# Patient Record
Sex: FEMALE | Race: WHITE | NOT HISPANIC OR LATINO | Employment: FULL TIME | URBAN - METROPOLITAN AREA
[De-identification: names, ages, dates, MRNs, and addresses within clinical notes are randomized per-mention and may not be internally consistent; named-entity substitution may affect disease eponyms.]

---

## 2017-01-16 DIAGNOSIS — Z30.40 ENCOUNTER FOR SURVEILLANCE OF CONTRACEPTIVES: ICD-10-CM

## 2017-01-16 RX ORDER — NORETHINDRONE ACETATE AND ETHINYL ESTRADIOL 1MG-20(21)
1 KIT ORAL DAILY
Qty: 28 TABLET | Refills: 11 | Status: SHIPPED | OUTPATIENT
Start: 2017-01-16 | End: 2017-02-13 | Stop reason: SDUPTHER

## 2017-01-18 ENCOUNTER — OFFICE VISIT (OUTPATIENT)
Dept: INTERNAL MEDICINE | Facility: CLINIC | Age: 42
End: 2017-01-18
Payer: COMMERCIAL

## 2017-01-18 VITALS
RESPIRATION RATE: 22 BRPM | TEMPERATURE: 99 F | HEART RATE: 92 BPM | SYSTOLIC BLOOD PRESSURE: 128 MMHG | DIASTOLIC BLOOD PRESSURE: 86 MMHG | WEIGHT: 129.19 LBS | BODY MASS INDEX: 23.63 KG/M2

## 2017-01-18 DIAGNOSIS — J01.41 ACUTE RECURRENT PANSINUSITIS: Primary | ICD-10-CM

## 2017-01-18 PROCEDURE — 99213 OFFICE O/P EST LOW 20 MIN: CPT | Mod: 25,S$GLB,, | Performed by: INTERNAL MEDICINE

## 2017-01-18 PROCEDURE — 96372 THER/PROPH/DIAG INJ SC/IM: CPT | Mod: S$GLB,,, | Performed by: INTERNAL MEDICINE

## 2017-01-18 PROCEDURE — 99999 PR PBB SHADOW E&M-EST. PATIENT-LVL III: CPT | Mod: PBBFAC,,, | Performed by: INTERNAL MEDICINE

## 2017-01-18 PROCEDURE — 1159F MED LIST DOCD IN RCRD: CPT | Mod: S$GLB,,, | Performed by: INTERNAL MEDICINE

## 2017-01-18 RX ORDER — FLUTICASONE PROPIONATE 50 MCG
2 SPRAY, SUSPENSION (ML) NASAL DAILY
Qty: 16 G | Refills: 12 | Status: SHIPPED | OUTPATIENT
Start: 2017-01-18 | End: 2017-02-17

## 2017-01-18 RX ORDER — TRIAMCINOLONE ACETONIDE 40 MG/ML
40 INJECTION, SUSPENSION INTRA-ARTICULAR; INTRAMUSCULAR
Status: COMPLETED | OUTPATIENT
Start: 2017-01-18 | End: 2017-01-18

## 2017-01-18 RX ADMIN — TRIAMCINOLONE ACETONIDE 40 MG: 40 INJECTION, SUSPENSION INTRA-ARTICULAR; INTRAMUSCULAR at 09:01

## 2017-01-18 NOTE — PATIENT INSTRUCTIONS
1. Kenalog 40 mgm injection in the office.  2. Continue the Claritin-D, mucinex-DM.  3. Add flonase 2 sprays in each nostril daily for up to 10 days.  4. High oral fluid intake.  5. Continue all regular meds.  6. May need antibiotic if develops purulent discharge or phlegm.

## 2017-01-18 NOTE — MR AVS SNAPSHOT
[facility]   [facility]  [place] [address]  Phone: [phone]  Fax: [phone]                  Christelle Reyes   2017 9:20 AM   Office Visit    Description:  Female : 1975   Provider:  Sridhar Portillo MD   Department:  [facility]           Reason for Visit     Sinus Problem     Cough     Nasal Congestion           Diagnoses this Visit        Comments    Acute recurrent pansinusitis    -  Primary            To Do List           Goals (5 Years of Data)     None      Follow-Up and Disposition     Return if symptoms worsen or fail to improve.       These Medications        Disp Refills Start End    fluticasone (FLONASE) 50 mcg/actuation nasal spray 16 g 12 2017    2 sprays by Each Nare route once daily. Use for up to 10 days at a time for nasal/allergy/sinus congestion. - Each Nare    Pharmacy: [facility] [address] [address] #: [phone]         [facility] On Call     [facility] On Call Nurse Care Line -  Assistance  Registered nurses in the [facility] On Call Center provide clinical advisement, health education, appointment booking, and other advisory services.  Call for this free service at [phone].             Medications           Message regarding Medications     Verify the changes and/or additions to your medication regime listed below are the same as discussed with your clinician today.  If any of these changes or additions are incorrect, please notify your healthcare provider.        START taking these NEW medications        Refills    fluticasone (FLONASE) 50 mcg/actuation nasal spray 12    Si sprays by Each Nare route once daily. Use for up to 10 days at a time for nasal/allergy/sinus congestion.    Class: Normal    Route: Each Nare      These medications were administered today        Dose Freq    triamcinolone acetonide injection 40 mg 40 mg  Clinic/HOD 1 time    Sig: Inject 1 mL (40 mg total) into the muscle one time.    Class: Normal    Route: Intramuscular           Verify that the below list of medications is an accurate representation of the medications you are currently taking.  If none reported, the list may be blank. If incorrect, please contact your healthcare provider. Carry this list with you in case of emergency.           Current Medications     ibuprofen (ADVIL,MOTRIN) 100 MG tablet Take 100 mg by mouth every 6 (six) hours as needed.    ibuprofen (ADVIL,MOTRIN) 800 MG tablet Take 1 tablet (800 mg total) by mouth 2 (two) times daily as needed for Pain.    loratadine (CLARITIN) 10 mg tablet Take 10 mg by mouth once daily.    norethindrone-ethinyl estradiol (JUNEL FE 1/20) 1 mg-20 mcg (21)/75 mg (7) per tablet Take 1 tablet by mouth once daily.    fluticasone (FLONASE) 50 mcg/actuation nasal spray 2 sprays by Each Nare route once daily. Use for up to 10 days at a time for nasal/allergy/sinus congestion.           Clinical Reference Information           Vital Signs - Last Recorded  Most recent update: 1/18/2017  9:16 AM by Dasha Su LPN    BP Pulse Temp Resp Wt LMP    128/86 92 98.5 °F (36.9 °C) (!) 22 58.6 kg (129 lb 3 oz) 11/26/2016    BMI                23.63 kg/m2          Blood Pressure          Most Recent Value    BP  128/86      Allergies as of 1/18/2017     Codeine      Immunizations Administered on Date of Encounter - 1/18/2017     None      Instructions    1. Kenalog 40 mgm injection in the office.  2. Continue the Claritin-D, mucinex-DM.  3. Add flonase 2 sprays in each nostril daily for up to 10 days.  4. High oral fluid intake.  5. Continue all regular meds.

## 2017-01-18 NOTE — PROGRESS NOTES
Subjective:       Patient ID: Christelle Reyes is a 41 y.o. female.    Chief Complaint: Sinus Problem; Cough; and Nasal Congestion    HPI Comments: Patient presents for urgent visit complaining of flare of her recurrent sinus problems.  This episode has been going on for about 1 week.  She describes head/sinus congestion with severe post nasal drip.  Now with cough.  Has clear naso-sinus drainage and clear phelgm which she coughs. No fever/chills.    Using Claritin-D and Mucinex-DM with some relief.  She states that this usually requires a steroid injection.  No steroid usage in past 1 year.      Review of Systems   Constitutional: Negative for chills and fever.   HENT: Positive for congestion, postnasal drip, sinus pressure and sore throat. Negative for ear discharge, ear pain and facial swelling.    Respiratory: Positive for cough. Negative for shortness of breath and wheezing.        Objective:      Physical Exam   Constitutional: She appears well-developed and well-nourished. No distress.   HENT:   Head: Normocephalic.   Right Ear: External ear normal.   Left Ear: External ear normal.   Mouth/Throat: Oropharynx is clear and moist. No oropharyngeal exudate.   Canals/TMs clear bilaterally.  Inflamed, boggy turbinates.     Cardiovascular: Normal rate, regular rhythm and normal heart sounds.    Pulmonary/Chest: Effort normal and breath sounds normal. No respiratory distress. She has no wheezes.   Lymphadenopathy:     She has no cervical adenopathy.   Vitals reviewed.      Assessment:   1. Acute, recurrent pansinusitis, probably allergic in nature.     Plan:   1. Kenalog 40 mgm IM in the office.  2. Flonase, 2 sprays in each nostril, daily for 10 days.  3. High oral fluid intake.  4. Continue Claritin-D and Mucinex-DM.  5. At this time, I do not think there is a place for antibiotics.  However, if she develops yellow/green discharge or phlegm, then contact me or her regular PCP for antibiotic.

## 2017-01-19 ENCOUNTER — PATIENT MESSAGE (OUTPATIENT)
Dept: INTERNAL MEDICINE | Facility: CLINIC | Age: 42
End: 2017-01-19

## 2017-01-19 RX ORDER — AZITHROMYCIN 250 MG/1
TABLET, FILM COATED ORAL
Qty: 6 TABLET | Refills: 0 | Status: SHIPPED | OUTPATIENT
Start: 2017-01-19 | End: 2017-02-13

## 2017-01-25 ENCOUNTER — PATIENT MESSAGE (OUTPATIENT)
Dept: INTERNAL MEDICINE | Facility: CLINIC | Age: 42
End: 2017-01-25

## 2017-01-25 ENCOUNTER — TELEPHONE (OUTPATIENT)
Dept: OBSTETRICS AND GYNECOLOGY | Facility: CLINIC | Age: 42
End: 2017-01-25

## 2017-01-25 NOTE — TELEPHONE ENCOUNTER
----- Message from Isabel Frias sent at 1/25/2017  1:27 PM CST -----  Appointment Request From: Christelle Reyes         With Provider: Taniya Brooks MD [[facility]]        Would Accept With:Only the person I've selected        Preferred Date Range: From 1/25/2017 To 2/23/2017        Preferred Times: Any        Reason for visit: Request an Appt        Comments:    I would like to schedule my annual appointment with Dr. Brooks in the month of February. I have tried to find available appointments through my [facility] without success.  When I called to book an appointment, I was told her calendar for February was not yet posted and that someone would call me to schedule an appointment when Dr. Brooks's schedule became available - I have not received a call.        Christelle Reyes    [phone]

## 2017-01-25 NOTE — TELEPHONE ENCOUNTER
----- Message from Jorge Acevedo sent at 1/25/2017  1:34 PM CST -----  Contact: pt  _ 1st Request   _ 2nd Request   _ 3rd Request     Who: JOSEPH ZAMORA [[id number]]    Why: Pt missed your call is requesting a call back to schedule her annual visit    What Number to Call Back: [phone]    When to Expect a call back: (Before the end of the day)   -- if call after 3:00 call back will be tomorrow.

## 2017-01-25 NOTE — TELEPHONE ENCOUNTER
Did improve on antib but left with congestion /cough.  Fever stopped Sunday.    What would you like to recommend to patient?   mucinex and saline nasal spray regularly or more antib or other?    Please advise.  Thanks moustapha

## 2017-01-25 NOTE — TELEPHONE ENCOUNTER
Unable to reach patient on this matter. LMOVM to call the office backUnable to reach patient on this matter. LMOVM to call the office back

## 2017-02-13 ENCOUNTER — OFFICE VISIT (OUTPATIENT)
Dept: OBSTETRICS AND GYNECOLOGY | Facility: CLINIC | Age: 42
End: 2017-02-13
Payer: COMMERCIAL

## 2017-02-13 VITALS
SYSTOLIC BLOOD PRESSURE: 112 MMHG | HEIGHT: 62 IN | WEIGHT: 127.44 LBS | BODY MASS INDEX: 23.45 KG/M2 | DIASTOLIC BLOOD PRESSURE: 70 MMHG

## 2017-02-13 DIAGNOSIS — Z01.419 ENCOUNTER FOR GYNECOLOGICAL EXAMINATION WITHOUT ABNORMAL FINDING: Primary | ICD-10-CM

## 2017-02-13 DIAGNOSIS — Z30.40 ENCOUNTER FOR SURVEILLANCE OF CONTRACEPTIVES: ICD-10-CM

## 2017-02-13 PROCEDURE — 88175 CYTOPATH C/V AUTO FLUID REDO: CPT

## 2017-02-13 PROCEDURE — 99999 PR PBB SHADOW E&M-EST. PATIENT-LVL III: CPT | Mod: PBBFAC,,, | Performed by: OBSTETRICS & GYNECOLOGY

## 2017-02-13 PROCEDURE — 99395 PREV VISIT EST AGE 18-39: CPT | Mod: S$GLB,,, | Performed by: OBSTETRICS & GYNECOLOGY

## 2017-02-13 RX ORDER — NORETHINDRONE ACETATE AND ETHINYL ESTRADIOL 1MG-20(21)
1 KIT ORAL DAILY
Qty: 28 TABLET | Refills: 11 | Status: SHIPPED | OUTPATIENT
Start: 2017-02-13 | End: 2018-01-30 | Stop reason: SDUPTHER

## 2017-02-13 NOTE — MR AVS SNAPSHOT
[facility]  [address] [facility], [address]  [facility] [address]  Phone: [phone]  Fax: [phone]                  Christelle Reyes   2017 9:45 AM   Office Visit    Description:  Female : 1975   Provider:  Taniya Brooks MD   Department:  [facility]           Reason for Visit     Gynecologic Exam                To Do List           Goals (5 Years of Data)     None      [facility] On Call     [facility] On Call Nurse Care Line -  Assistance  Registered nurses in the [facility] On Call Center provide clinical advisement, health education, appointment booking, and other advisory services.  Call for this free service at [phone].             Medications           Message regarding Medications     Verify the changes and/or additions to your medication regime listed below are the same as discussed with your clinician today.  If any of these changes or additions are incorrect, please notify your healthcare provider.        STOP taking these medications     azithromycin (ZITHROMAX Z-FERNY) 250 MG tablet Take 2 tabs the first day and 1 tab qd thereafter.           Verify that the below list of medications is an accurate representation of the medications you are currently taking.  If none reported, the list may be blank. If incorrect, please contact your healthcare provider. Carry this list with you in case of emergency.           Current Medications     fluticasone (FLONASE) 50 mcg/actuation nasal spray 2 sprays by Each Nare route once daily. Use for up to 10 days at a time for nasal/allergy/sinus congestion.    ibuprofen (ADVIL,MOTRIN) 100 MG tablet Take 100 mg by mouth every 6 (six) hours as needed.    ibuprofen (ADVIL,MOTRIN) 800 MG tablet Take 1 tablet (800 mg total) by mouth 2 (two) times daily as needed for Pain.    loratadine (CLARITIN) 10 mg tablet Take 10 mg by mouth once daily.    norethindrone-ethinyl estradiol (JUNEL FE 1/20) 1 mg-20 mcg (21)/75 mg (7) per  "tablet Take 1 tablet by mouth once daily.           Clinical Reference Information           Your Vitals Were     BP Height Weight Last Period BMI    112/70 5' 2" (1.575 m) 57.8 kg (127 lb 6.8 oz) 01/23/2017 (Within Days) 23.31 kg/m2      Blood Pressure          Most Recent Value    BP  112/70      Allergies as of 2/13/2017     Codeine      Immunizations Administered on Date of Encounter - 2/13/2017     None      Language Assistance Services     ATTENTION: Language assistance services are available, free of charge. Please call [phone].      ATENCIÓN: Si habla español, tiene a العلي disposición servicios gratuitos de asistencia lingüística. Llame al [phone].     YEVGENIY Ý: N?u b?n nói Ti?ng Vi?t, có các d?ch v? h? tr? ngôn ng? mi?n phí dành cho b?n. G?i s? [phone].         [facility] complies with applicable Federal civil rights laws and does not discriminate on the basis of race, color, national origin, age, disability, or sex.        "

## 2017-02-13 NOTE — PROGRESS NOTES
"CC: Well woman exam    Christelle Reyes is a 41 y.o. female  presents for a well woman exam.  She has no issues, problems, or complaints.  Occ SYMONE--worse with wt gain or illness.  Tolerable.  Does Kegels occasionally.    Last pap 2014--normal    History reviewed. No pertinent past medical history.    Past Surgical History   Procedure Laterality Date    Mouth surgery         OB History    Para Term  AB SAB TAB Ectopic Multiple Living   3 3 3       3      # Outcome Date GA Lbr Chirag/2nd Weight Sex Delivery Anes PTL Lv   3 Term 11 40w0d   F Vag-Spont  N Y   2 Term 05 40w3d   M Vag-Spont  N Y   1 Term 01 40w0d   M Vag-Spont  N Y          Family History   Problem Relation Age of Onset    Brain cancer Paternal Grandfather     Thyroid disease Maternal Grandfather     Hypertension Father     Diabetes Father     Melanoma Maternal Aunt     Skin cancer Maternal Uncle     Glaucoma Maternal Grandmother     Cataracts Maternal Grandmother     Hyperlipidemia Mother     Hyperthyroidism Sister     Amblyopia Neg Hx     Blindness Neg Hx     Cancer Neg Hx     Macular degeneration Neg Hx     Retinal detachment Neg Hx     Strabismus Neg Hx     Stroke Neg Hx     Breast cancer Neg Hx     Colon cancer Neg Hx     Ovarian cancer Neg Hx        Social History   Substance Use Topics    Smoking status: Never Smoker    Smokeless tobacco: Never Used    Alcohol use 0.0 oz/week     0 Standard drinks or equivalent per week      Comment: rarely       Visit Vitals    /70    Ht 5' 2" (1.575 m)    Wt 57.8 kg (127 lb 6.8 oz)    LMP 2017 (Within Days)    BMI 23.31 kg/m2       ROS:  GENERAL: Denies weight gain or weight loss. Feeling well overall.   SKIN: Denies rash or lesions.   HEAD: Denies head injury or headache.   NODES: Denies enlarged lymph nodes.   CHEST: Denies chest pain or shortness of breath.   CARDIOVASCULAR: Denies palpitations or left sided chest pain. "   ABDOMEN: No abdominal pain, constipation, diarrhea, nausea, vomiting or rectal bleeding.   URINARY: No frequency, dysuria, hematuria, or burning on urination.  REPRODUCTIVE: See HPI.   BREASTS: The patient performs breast self-examination and denies pain, lumps, or nipple discharge.   HEMATOLOGIC: No easy bruisability or excessive bleeding.  MUSCULOSKELETAL: Denies joint pain or swelling.   NEUROLOGIC: Denies syncope or weakness.   PSYCHIATRIC: Denies depression, anxiety or mood swings.    Physical Exam:  APPEARANCE: Well nourished, well developed, in no acute distress.   AFFECT: WNL, alert and oriented x 3  SKIN: No acne or hirsutism  NECK: Neck symmetric without masses or thyromegaly  NODES: No inguinal, cervical, axillary, or femoral lymph node enlargement  CHEST: Good respiratory effect  ABDOMEN: Soft.  No tenderness or masses.  No hepatosplenomegaly.  No hernias.  BREASTS: Symmetrical, no skin changes or visible lesions.  No palpable masses, nipple discharge bilaterally.  Fibrocystic changes equal and bilateral.  PELVIC: Normal external genitalia without lesions.  Normal hair distribution.  Adequate perineal body, normal urethral meatus.  Vagina moist and well rugated without lesions or discharge.  Cervix pink, without lesions, discharge or tenderness.  No significant cystocele or rectocele.  Bimanual exam shows uterus to be normal size, regular, mobile and nontender.  Adnexa without masses or tenderness.    EXTREMITIES: No edema.    ASSESSMENT AND PLAN  1. Encounter for gynecological examination without abnormal finding  Liquid-based pap smear, screening   2. Encounter for surveillance of contraceptives  norethindrone-ethinyl estradiol (JUNEL FE 1/20) 1 mg-20 mcg (21)/75 mg (7) per tablet       Patient was counseled today on diet, exercise and A.C.S. Pap guidelines and recommendations for yearly pelvic exams, mammograms and monthly self breast exams; to see her PCP for other health maintenance. Next pap due  2020 if this one is normal.    Return in about 1 year (around 2/13/2018).

## 2017-06-20 ENCOUNTER — OFFICE VISIT (OUTPATIENT)
Dept: INTERNAL MEDICINE | Facility: CLINIC | Age: 42
End: 2017-06-20
Payer: COMMERCIAL

## 2017-06-20 VITALS
SYSTOLIC BLOOD PRESSURE: 123 MMHG | TEMPERATURE: 98 F | WEIGHT: 128.31 LBS | HEART RATE: 89 BPM | HEIGHT: 62 IN | BODY MASS INDEX: 23.61 KG/M2 | DIASTOLIC BLOOD PRESSURE: 84 MMHG

## 2017-06-20 DIAGNOSIS — Z00.00 ANNUAL PHYSICAL EXAM: Primary | ICD-10-CM

## 2017-06-20 DIAGNOSIS — F40.243 FLYING PHOBIA: ICD-10-CM

## 2017-06-20 PROCEDURE — 99999 PR PBB SHADOW E&M-EST. PATIENT-LVL III: CPT | Mod: PBBFAC,,, | Performed by: INTERNAL MEDICINE

## 2017-06-20 PROCEDURE — 99395 PREV VISIT EST AGE 18-39: CPT | Mod: S$GLB,,, | Performed by: INTERNAL MEDICINE

## 2017-06-20 RX ORDER — BUSPIRONE HYDROCHLORIDE 5 MG/1
5 TABLET ORAL 2 TIMES DAILY
Qty: 30 TABLET | Refills: 11 | Status: SHIPPED | OUTPATIENT
Start: 2017-06-20 | End: 2018-01-30

## 2017-06-20 NOTE — PROGRESS NOTES
Subjective:       Patient ID: Christelle Reyes is a 41 y.o. female.    Chief Complaint: Anxiety (trip in a week)    Patient is a 41 y.o.female who presents today for annual.      Cholesterol: (due now)  Gyn exam: up to date  Exercise: active  Diet: healthy  LMP: regular    Getting ready to go on a flight and has flight anxiety. Took buspar in the past which worked well.      History reviewed. No pertinent past medical history.  Past Surgical History:   Procedure Laterality Date    MOUTH SURGERY       Social History     Social History    Marital status:      Spouse name: N/A    Number of children: N/A    Years of education: N/A     Occupational History    Not on file.     Social History Main Topics    Smoking status: Never Smoker    Smokeless tobacco: Never Used    Alcohol use 0.0 oz/week      Comment: rarely    Drug use: No    Sexual activity: Yes     Partners: Male     Birth control/ protection: OCP     Other Topics Concern    Not on file     Social History Narrative    No narrative on file     Review of patient's allergies indicates:   Allergen Reactions    Codeine      Other reaction(s): Hallucinations     Ms. Reyes does not currently have medications on file.    Review of Systems   Constitutional: Negative for activity change, appetite change, chills, diaphoresis, fatigue, fever and unexpected weight change.   HENT: Negative for congestion, dental problem, ear discharge, ear pain, hearing loss, postnasal drip, rhinorrhea, sinus pressure, sore throat and trouble swallowing.    Eyes: Negative for discharge, redness, itching and visual disturbance.   Respiratory: Negative for cough, chest tightness, shortness of breath and wheezing.    Cardiovascular: Negative for chest pain, palpitations and leg swelling.   Gastrointestinal: Negative for abdominal pain, blood in stool, constipation, diarrhea, nausea and vomiting.   Endocrine: Negative for cold intolerance, heat intolerance, polydipsia  and polyuria.   Genitourinary: Negative for difficulty urinating, dysuria, frequency, hematuria, menstrual problem and urgency.   Musculoskeletal: Negative for arthralgias, back pain, gait problem, joint swelling and myalgias.   Skin: Negative for color change and rash.   Neurological: Negative for dizziness, syncope and weakness.   Hematological: Negative for adenopathy.   Psychiatric/Behavioral: Negative for behavioral problems, confusion, dysphoric mood and sleep disturbance. The patient is not nervous/anxious.        Objective:      Physical Exam   Constitutional: She is oriented to person, place, and time. She appears well-developed and well-nourished. No distress.   HENT:   Head: Normocephalic and atraumatic.   Right Ear: External ear normal.   Left Ear: External ear normal.   Nose: Nose normal.   Mouth/Throat: Oropharynx is clear and moist. No oropharyngeal exudate.   Eyes: Conjunctivae and EOM are normal. Pupils are equal, round, and reactive to light. Right eye exhibits no discharge. Left eye exhibits no discharge. No scleral icterus.   Neck: Normal range of motion. Neck supple. No JVD present. No thyromegaly present.   Cardiovascular: Normal rate, regular rhythm, normal heart sounds and intact distal pulses.  Exam reveals no gallop and no friction rub.    No murmur heard.  Pulmonary/Chest: Effort normal and breath sounds normal. No stridor. No respiratory distress. She has no wheezes. She has no rales. She exhibits no tenderness.   Abdominal: Soft. Bowel sounds are normal. She exhibits no distension. There is no tenderness. There is no rebound.   Musculoskeletal: Normal range of motion. She exhibits no edema or tenderness.   Lymphadenopathy:     She has no cervical adenopathy.   Neurological: She is alert and oriented to person, place, and time. No cranial nerve deficit.   Skin: Skin is warm. No rash noted. She is not diaphoretic. No erythema.   Psychiatric: She has a normal mood and affect. Her behavior  is normal.   Nursing note and vitals reviewed.      Assessment and Plan:       1. Annual physical exam  - CBC auto differential; Future  - Comprehensive metabolic panel; Future  - TSH; Future  - Lipid panel; Future  - Urinalysis; Future  - Vitamin D; Future    2. Flying phobia  - buspar prn          No Follow-up on file.

## 2017-07-26 ENCOUNTER — LAB VISIT (OUTPATIENT)
Dept: LAB | Facility: HOSPITAL | Age: 42
End: 2017-07-26
Attending: INTERNAL MEDICINE
Payer: COMMERCIAL

## 2017-07-26 DIAGNOSIS — Z00.00 ANNUAL PHYSICAL EXAM: ICD-10-CM

## 2017-07-26 LAB
25(OH)D3+25(OH)D2 SERPL-MCNC: 23 NG/ML
ALBUMIN SERPL BCP-MCNC: 3.6 G/DL
ALP SERPL-CCNC: 43 U/L
ALT SERPL W/O P-5'-P-CCNC: 11 U/L
ANION GAP SERPL CALC-SCNC: 9 MMOL/L
AST SERPL-CCNC: 15 U/L
BASOPHILS # BLD AUTO: 0.05 K/UL
BASOPHILS NFR BLD: 0.5 %
BILIRUB SERPL-MCNC: 0.5 MG/DL
BUN SERPL-MCNC: 10 MG/DL
CALCIUM SERPL-MCNC: 9.2 MG/DL
CHLORIDE SERPL-SCNC: 103 MMOL/L
CHOLEST/HDLC SERPL: 3 {RATIO}
CO2 SERPL-SCNC: 26 MMOL/L
CREAT SERPL-MCNC: 0.7 MG/DL
DIFFERENTIAL METHOD: NORMAL
EOSINOPHIL # BLD AUTO: 0.1 K/UL
EOSINOPHIL NFR BLD: 1.3 %
ERYTHROCYTE [DISTWIDTH] IN BLOOD BY AUTOMATED COUNT: 12.9 %
EST. GFR  (AFRICAN AMERICAN): >60 ML/MIN/1.73 M^2
EST. GFR  (NON AFRICAN AMERICAN): >60 ML/MIN/1.73 M^2
GLUCOSE SERPL-MCNC: 81 MG/DL
HCT VFR BLD AUTO: 41.6 %
HDL/CHOLESTEROL RATIO: 33.3 %
HDLC SERPL-MCNC: 174 MG/DL
HDLC SERPL-MCNC: 58 MG/DL
HGB BLD-MCNC: 14 G/DL
LDLC SERPL CALC-MCNC: 102.6 MG/DL
LYMPHOCYTES # BLD AUTO: 2.7 K/UL
LYMPHOCYTES NFR BLD: 28.4 %
MCH RBC QN AUTO: 30.2 PG
MCHC RBC AUTO-ENTMCNC: 33.7 G/DL
MCV RBC AUTO: 90 FL
MONOCYTES # BLD AUTO: 0.5 K/UL
MONOCYTES NFR BLD: 5.4 %
NEUTROPHILS # BLD AUTO: 6.1 K/UL
NEUTROPHILS NFR BLD: 64.1 %
NONHDLC SERPL-MCNC: 116 MG/DL
PLATELET # BLD AUTO: 222 K/UL
PMV BLD AUTO: 10.9 FL
POTASSIUM SERPL-SCNC: 3.7 MMOL/L
PROT SERPL-MCNC: 6.9 G/DL
RBC # BLD AUTO: 4.63 M/UL
SODIUM SERPL-SCNC: 138 MMOL/L
TRIGL SERPL-MCNC: 67 MG/DL
TSH SERPL DL<=0.005 MIU/L-ACNC: 2.07 UIU/ML
WBC # BLD AUTO: 9.51 K/UL

## 2017-07-26 PROCEDURE — 80061 LIPID PANEL: CPT

## 2017-07-26 PROCEDURE — 36415 COLL VENOUS BLD VENIPUNCTURE: CPT | Mod: PO

## 2017-07-26 PROCEDURE — 82306 VITAMIN D 25 HYDROXY: CPT

## 2017-07-26 PROCEDURE — 80053 COMPREHEN METABOLIC PANEL: CPT

## 2017-07-26 PROCEDURE — 84443 ASSAY THYROID STIM HORMONE: CPT

## 2017-07-26 PROCEDURE — 85025 COMPLETE CBC W/AUTO DIFF WBC: CPT

## 2017-12-11 ENCOUNTER — PATIENT MESSAGE (OUTPATIENT)
Dept: INTERNAL MEDICINE | Facility: CLINIC | Age: 42
End: 2017-12-11

## 2017-12-11 DIAGNOSIS — Z12.31 VISIT FOR SCREENING MAMMOGRAM: Primary | ICD-10-CM

## 2017-12-12 ENCOUNTER — HOSPITAL ENCOUNTER (OUTPATIENT)
Dept: RADIOLOGY | Facility: HOSPITAL | Age: 42
Discharge: HOME | End: 2017-12-12
Attending: INTERNAL MEDICINE
Payer: COMMERCIAL

## 2017-12-12 VITALS — WEIGHT: 128 LBS | BODY MASS INDEX: 23.55 KG/M2 | HEIGHT: 62 IN

## 2017-12-12 DIAGNOSIS — N63.0 LUMP IN FEMALE BREAST: ICD-10-CM

## 2017-12-12 DIAGNOSIS — Z12.31 VISIT FOR SCREENING MAMMOGRAM: ICD-10-CM

## 2017-12-12 PROCEDURE — 76642 ULTRASOUND BREAST LIMITED: CPT | Mod: TC,PO,RT

## 2017-12-12 PROCEDURE — 77066 DX MAMMO INCL CAD BI: CPT | Mod: 26,,, | Performed by: RADIOLOGY

## 2017-12-12 PROCEDURE — 77066 DX MAMMO INCL CAD BI: CPT | Mod: TC,PO

## 2017-12-12 PROCEDURE — 76642 ULTRASOUND BREAST LIMITED: CPT | Mod: 26,RT,, | Performed by: RADIOLOGY

## 2017-12-12 PROCEDURE — 77062 BREAST TOMOSYNTHESIS BI: CPT | Mod: 26,,, | Performed by: RADIOLOGY

## 2018-01-10 ENCOUNTER — OFFICE VISIT (OUTPATIENT)
Dept: URGENT CARE | Facility: CLINIC | Age: 43
End: 2018-01-10
Payer: COMMERCIAL

## 2018-01-10 VITALS
BODY MASS INDEX: 24.55 KG/M2 | WEIGHT: 130 LBS | SYSTOLIC BLOOD PRESSURE: 148 MMHG | DIASTOLIC BLOOD PRESSURE: 102 MMHG | OXYGEN SATURATION: 96 % | TEMPERATURE: 97 F | HEART RATE: 112 BPM | RESPIRATION RATE: 16 BRPM | HEIGHT: 61 IN

## 2018-01-10 DIAGNOSIS — J01.10 ACUTE FRONTAL SINUSITIS, RECURRENCE NOT SPECIFIED: ICD-10-CM

## 2018-01-10 DIAGNOSIS — R05.9 COUGH: Primary | ICD-10-CM

## 2018-01-10 DIAGNOSIS — J02.9 ACUTE PHARYNGITIS, UNSPECIFIED ETIOLOGY: ICD-10-CM

## 2018-01-10 PROCEDURE — 99213 OFFICE O/P EST LOW 20 MIN: CPT | Mod: S$GLB,,, | Performed by: INTERNAL MEDICINE

## 2018-01-10 RX ORDER — AZITHROMYCIN 250 MG/1
250 TABLET, FILM COATED ORAL DAILY
Qty: 6 TABLET | Refills: 0 | Status: SHIPPED | OUTPATIENT
Start: 2018-01-10 | End: 2018-01-15

## 2018-01-10 RX ORDER — PREDNISONE 20 MG/1
20 TABLET ORAL DAILY
Qty: 5 TABLET | Refills: 0 | Status: SHIPPED | OUTPATIENT
Start: 2018-01-10 | End: 2018-01-15

## 2018-01-10 NOTE — PATIENT INSTRUCTIONS
Acute Bacterial Rhinosinusitis (ABRS)    Acute bacterial rhinosinusitis (ABRS) is an infection of your nasal cavity and sinuses. Its caused by bacteria. Acute means that youve had symptoms for less than 12 weeks.  Understanding your sinuses  The nasal cavity is the large air-filled space behind your nose. The sinuses are a group of spaces formed by the bones of your face. They connect with your nasal cavity. ABRS causes the tissue lining these spaces to become inflamed. Mucus may not drain normally. This leads to facial pain and other symptoms.  What causes ABRS?  ABRS most often follows an upper respiratory infection caused by a virus. Bacteria then infect the lining of your nasal cavity and sinuses. But you can also get ABRS if you have:  · Nasal allergies  · Long-term nasal swelling and congestion not caused by allergies  · Blockage in the nose  Symptoms of ABRS  The symptoms of ABRS may be different for each person, and can include:  · Nasal congestion  · Runny nose  · Fluid draining from the nose down the throat (postnasal drip)  · Headache  · Cough  · Pain in the sinuses  · Thick, colored fluid from the nose (mucus)  · Fever  Diagnosing ABRS  ABRS may be diagnosed if youve had an upper respiratory infection like a cold and cough for longer than 10 to 14 days. Your health care provider will ask about your symptoms and your medical history. The provider will check your vital signs, including your temperature. Youll have a physical exam. The health care provider will check your ears, nose, and throat. You likely wont need any tests. If ABRS comes back, you may have a culture or other tests.  Treatment for ABRS  Treatment may include:  · Antibiotic medicine. This is for symptoms that last for at least 10 to 14 days.  · Nasal corticosteroid medicine. Drops or spray used in the nose can lessen swelling and congestion.  · Over-the-counter pain medicine. This is to lessen sinus pain and pressure.  · Nasal  decongestant medicine. Spray or drops may help to lessen congestion. Do not use them for more than a few days.  · Salt wash (saline irrigation). This can help to loosen mucus.  Possible complications of ABRS  ABRS may come back or become long-term (chronic).  In rare cases, ABRS may cause complications such as:   · Inflamed tissue around the brain and spinal cord (meningitis)  · Inflamed tissue around the eyes (orbital cellulitis)  · Inflamed bones around the sinuses (osteitis)  These problems may need to be treated in a hospital with intravenous (IV) antibiotic medicine or surgery.  When to call the health care provider  Call your health care provider if you have any of the following:  · Symptoms that dont get better, or get worse  · Symptoms that dont get better after 3 to 5 days on antibiotics  · Trouble seeing  · Swelling around your eyes  · Confusion or trouble staying awake   Date Last Reviewed: 3/3/2015  © 6399-8153 [employer]. [address] [place] [address]. All rights reserved. This information is not intended as a substitute for professional medical care. Always follow your healthcare professional's instructions.  Please return here or go to the Emergency Department for any concerns or worsening of condition.  If you were prescribed antibiotics, please take them to completion.  If you were prescribed a narcotic medication, do not drive or operate heavy equipment or machinery while taking these medications.  Please follow up with your primary care doctor or specialist as needed.    If you  smoke, please stop smoking.  1) Motrin/advil/ibuprofen- Take Two to Three Tablets(200 mg) three Times a Day for 5 to 7 Days.  2) Mucinex D 1/2 to 1 Tablet twice a day for 5 to 7 Days.  3) Drink Hot Liquids(coffee,WATER,Tea,Hot Chocolate,or Soup) that you put in a Mug place in Microwave for 2.5 to 3 minutes CHANGE THE CUP THAT WAS USED IN THE MICROWAVE SO AS NOT TO BURN YOUR MOUTH,then sniff the  steam from the cup and sip the heated liquid TEN TO TWELVE TIMES A DAY for 5 to 7 Days.  4) These 3 things will help the antibiotics and other medications work faster and will speed your recovery!

## 2018-01-10 NOTE — PROGRESS NOTES
"Subjective:       Patient ID: Christelle Reyes is a 42 y.o. female.    Vitals:  height is 5' 1" (1.549 m) and weight is 59 kg (130 lb). Her oral temperature is 97.3 °F (36.3 °C). Her blood pressure is 148/102 (abnormal) and her pulse is 112 (abnormal). Her respiration is 16 and oxygen saturation is 96%.     Chief Complaint: Cough and Sore Throat    Cough   This is a new problem. The current episode started in the past 7 days. The problem has been gradually worsening. The cough is productive of purulent sputum. Associated symptoms include a sore throat. Pertinent negatives include no chest pain, chills, ear pain, eye redness, fever, headaches, myalgias, shortness of breath or wheezing. Nothing aggravates the symptoms. Treatments tried: mucinex. The treatment provided no relief.   Sore Throat    This is a new problem. The current episode started in the past 7 days. There has been no fever. Associated symptoms include coughing. Pertinent negatives include no abdominal pain, congestion, ear pain, headaches, hoarse voice or shortness of breath. She has tried nothing for the symptoms. The treatment provided no relief.     Review of Systems   Constitution: Negative for chills, fever and malaise/fatigue.   HENT: Positive for sore throat. Negative for congestion, ear pain and hoarse voice.    Eyes: Negative for discharge and redness.   Cardiovascular: Negative for chest pain, dyspnea on exertion and leg swelling.   Respiratory: Positive for cough. Negative for shortness of breath, sputum production and wheezing.    Musculoskeletal: Negative for myalgias.   Gastrointestinal: Negative for abdominal pain and nausea.   Neurological: Negative for headaches.       Objective:      Physical Exam   Constitutional: She is oriented to person, place, and time. She appears well-developed and well-nourished. She is cooperative.  Non-toxic appearance. She does not appear ill. No distress.   HENT:   Head: Normocephalic and atraumatic. "   Right Ear: Hearing, external ear and ear canal normal. Tympanic membrane is injected and erythematous.   Left Ear: Hearing, external ear and ear canal normal. Tympanic membrane is injected and erythematous.   Nose: Mucosal edema and rhinorrhea present. No nasal deformity. No epistaxis. Right sinus exhibits no maxillary sinus tenderness and no frontal sinus tenderness. Left sinus exhibits frontal sinus tenderness. Left sinus exhibits no maxillary sinus tenderness.   Mouth/Throat: Uvula is midline, oropharynx is clear and moist and mucous membranes are normal. No trismus in the jaw. Normal dentition. No uvula swelling. No posterior oropharyngeal erythema.       Eyes: Conjunctivae and lids are normal. No scleral icterus.   Sclera clear bilat   Neck: Trachea normal, full passive range of motion without pain and phonation normal. Neck supple.   Cardiovascular: Normal rate, regular rhythm, normal heart sounds, intact distal pulses and normal pulses.    Pulmonary/Chest: Effort normal and breath sounds normal. No respiratory distress.   Abdominal: Soft. Normal appearance and bowel sounds are normal. She exhibits no distension. There is no tenderness.   Musculoskeletal: Normal range of motion. She exhibits no edema or deformity.   Neurological: She is alert and oriented to person, place, and time. She exhibits normal muscle tone. Coordination normal.   Skin: Skin is warm, dry and intact. She is not diaphoretic. No pallor.   Psychiatric: She has a normal mood and affect. Her speech is normal and behavior is normal. Judgment and thought content normal. Cognition and memory are normal.   Nursing note and vitals reviewed.      Assessment:       1. Cough    2. Acute frontal sinusitis, recurrence not specified    3. Acute pharyngitis, unspecified etiology        Plan:         Cough    Acute frontal sinusitis, recurrence not specified  -     predniSONE (DELTASONE) 20 MG tablet; Take 1 tablet (20 mg total) by mouth once daily.   Dispense: 5 tablet; Refill: 0  -     azithromycin (ZITHROMAX) 250 MG tablet; Take 1 tablet (250 mg total) by mouth once daily. Double first dose  Dispense: 6 tablet; Refill: 0    Acute pharyngitis, unspecified etiology  -     predniSONE (DELTASONE) 20 MG tablet; Take 1 tablet (20 mg total) by mouth once daily.  Dispense: 5 tablet; Refill: 0  -     azithromycin (ZITHROMAX) 250 MG tablet; Take 1 tablet (250 mg total) by mouth once daily. Double first dose  Dispense: 6 tablet; Refill: 0      Take meds

## 2018-01-30 ENCOUNTER — OFFICE VISIT (OUTPATIENT)
Dept: OBSTETRICS AND GYNECOLOGY | Facility: CLINIC | Age: 43
End: 2018-01-30
Payer: COMMERCIAL

## 2018-01-30 VITALS
BODY MASS INDEX: 24.77 KG/M2 | WEIGHT: 131.19 LBS | HEIGHT: 61 IN | DIASTOLIC BLOOD PRESSURE: 88 MMHG | SYSTOLIC BLOOD PRESSURE: 124 MMHG

## 2018-01-30 DIAGNOSIS — Z01.419 WOMEN'S ANNUAL ROUTINE GYNECOLOGICAL EXAMINATION: Primary | ICD-10-CM

## 2018-01-30 DIAGNOSIS — Z30.49 ENCOUNTER FOR SURVEILLANCE OF OTHER CONTRACEPTIVE: ICD-10-CM

## 2018-01-30 DIAGNOSIS — Z12.31 ENCOUNTER FOR SCREENING MAMMOGRAM FOR BREAST CANCER: ICD-10-CM

## 2018-01-30 PROCEDURE — 99396 PREV VISIT EST AGE 40-64: CPT | Mod: S$GLB,,, | Performed by: NURSE PRACTITIONER

## 2018-01-30 PROCEDURE — 99999 PR PBB SHADOW E&M-EST. PATIENT-LVL III: CPT | Mod: PBBFAC,,, | Performed by: NURSE PRACTITIONER

## 2018-01-30 RX ORDER — NORETHINDRONE ACETATE AND ETHINYL ESTRADIOL 1MG-20(21)
1 KIT ORAL DAILY
Qty: 28 TABLET | Refills: 11 | Status: SHIPPED | OUTPATIENT
Start: 2018-01-30 | End: 2018-12-04 | Stop reason: SDUPTHER

## 2018-01-30 NOTE — PROGRESS NOTES
CC: Annual  HPI: Pt is a 42 y.o.  female who presents for routine annual exam.  Works for [employer].  She uses OCPs for contraception. She does not want STD screening.  Denies any GYN complaints. Pt has normal mammogram and breast US in 12/2017.        ROS:  GENERAL: Feeling well overall. Denies fever or chills.   SKIN: Denies rash or lesions.   HEAD: Denies head injury or headache.   NODES: Denies enlarged lymph nodes.   CHEST: Denies chest pain or shortness of breath.   CARDIOVASCULAR: Denies palpitations or left sided chest pain.   ABDOMEN: No abdominal pain, constipation, diarrhea, nausea, vomiting or rectal bleeding.   URINARY: No dysuria, hematuria, or burning on urination.  REPRODUCTIVE: See HPI.   BREASTS: Denies pain, lumps, or nipple discharge.   HEMATOLOGIC: No easy bruisability or excessive bleeding.   MUSCULOSKELETAL: Denies joint pain or swelling.   NEUROLOGIC: Denies syncope or weakness.   PSYCHIATRIC: Denies depression, anxiety or mood swings.    PE:   APPEARANCE: Well nourished, well developed, White female in no acute distress.  NODES: no cervical, supraclavicular, or inguinal lymphadenopathy  BREASTS: Symmetrical, no skin changes or visible lesions. No palpable masses, nipple discharge or adenopathy bilaterally.  ABDOMEN: Soft. No tenderness or masses. No distention. No hernias palpated. No CVA tenderness.  VULVA: No lesions. Normal external female genitalia.  URETHRAL MEATUS: Normal size and location, no lesions, no prolapse.  URETHRA: No masses, tenderness, or prolapse.  VAGINA: Moist. No lesions or lacerations noted. No abnormal discharge present. No odor present.   CERVIX: No lesions or discharge. No cervical motion tenderness.   UTERUS: Normal size, regular shape, mobile, non-tender.  ADNEXA: No tenderness. No fullness or masses palpated in the adnexal regions.   ANUS PERINEUM: Normal.      Diagnosis:  1. Women's annual routine gynecological examination    2. Encounter for  surveillance of other contraceptive    3. Encounter for screening mammogram for breast cancer        Plan:   Pap not indicated- last pap 2/2017 WNL  Mammo in 12/2018  OCPs refilled    Orders Placed This Encounter    Mammo Digital Screening Bilat with Tomos[employer] CAD    norethindrone-ethinyl estradiol (JUNEL FE 1/20) 1 mg-20 mcg (21)/75 mg (7) per tablet       Patient was counseled today on the new ACS guidelines for cervical cytology screening as well as the current recommendations for breast cancer screening. She was counseled to follow up with her PCP for other routine health maintenance. Counseling session lasted approximately 10 minutes, and all her questions were answered.    Follow-up with me in 1 year for routine exam    CHEYENNE SrC

## 2018-03-23 ENCOUNTER — PATIENT MESSAGE (OUTPATIENT)
Dept: INTERNAL MEDICINE | Facility: CLINIC | Age: 43
End: 2018-03-23

## 2018-03-27 ENCOUNTER — PATIENT MESSAGE (OUTPATIENT)
Dept: INTERNAL MEDICINE | Facility: CLINIC | Age: 43
End: 2018-03-27

## 2018-11-08 ENCOUNTER — PATIENT MESSAGE (OUTPATIENT)
Dept: INTERNAL MEDICINE | Facility: CLINIC | Age: 43
End: 2018-11-08

## 2018-11-08 DIAGNOSIS — Z00.00 ANNUAL PHYSICAL EXAM: Primary | ICD-10-CM

## 2018-11-08 DIAGNOSIS — M25.50 ARTHRALGIA, UNSPECIFIED JOINT: ICD-10-CM

## 2018-12-04 ENCOUNTER — PATIENT MESSAGE (OUTPATIENT)
Dept: OBSTETRICS AND GYNECOLOGY | Facility: CLINIC | Age: 43
End: 2018-12-04

## 2018-12-04 DIAGNOSIS — Z30.49 ENCOUNTER FOR SURVEILLANCE OF OTHER CONTRACEPTIVE: ICD-10-CM

## 2018-12-04 RX ORDER — NORETHINDRONE ACETATE AND ETHINYL ESTRADIOL AND FERROUS FUMARATE 1MG-20(21)
KIT ORAL
Qty: 28 TABLET | Refills: 11 | Status: SHIPPED | OUTPATIENT
Start: 2018-12-04 | End: 2018-12-04 | Stop reason: SDUPTHER

## 2018-12-04 RX ORDER — NORETHINDRONE ACETATE AND ETHINYL ESTRADIOL 1MG-20(21)
1 KIT ORAL DAILY
Qty: 28 TABLET | Refills: 1 | Status: SHIPPED | OUTPATIENT
Start: 2018-12-04 | End: 2019-01-18 | Stop reason: SDUPTHER

## 2019-01-15 ENCOUNTER — HOSPITAL ENCOUNTER (OUTPATIENT)
Dept: RADIOLOGY | Facility: HOSPITAL | Age: 44
Discharge: HOME | End: 2019-01-15
Attending: NURSE PRACTITIONER
Payer: COMMERCIAL

## 2019-01-15 VITALS — HEIGHT: 61 IN | BODY MASS INDEX: 24.73 KG/M2 | WEIGHT: 131 LBS

## 2019-01-15 DIAGNOSIS — Z12.31 ENCOUNTER FOR SCREENING MAMMOGRAM FOR BREAST CANCER: ICD-10-CM

## 2019-01-15 PROCEDURE — 77067 SCR MAMMO BI INCL CAD: CPT | Mod: TC

## 2019-01-15 PROCEDURE — 77063 MAMMO DIGITAL SCREENING BILAT WITH TOMOSYNTHESIS_CAD: ICD-10-PCS | Mod: 26,,, | Performed by: RADIOLOGY

## 2019-01-15 PROCEDURE — 77063 BREAST TOMOSYNTHESIS BI: CPT | Mod: 26,,, | Performed by: RADIOLOGY

## 2019-01-15 PROCEDURE — 77067 MAMMO DIGITAL SCREENING BILAT WITH TOMOSYNTHESIS_CAD: ICD-10-PCS | Mod: 26,,, | Performed by: RADIOLOGY

## 2019-01-15 PROCEDURE — 77067 SCR MAMMO BI INCL CAD: CPT | Mod: 26,,, | Performed by: RADIOLOGY

## 2019-01-18 ENCOUNTER — OFFICE VISIT (OUTPATIENT)
Dept: OBSTETRICS AND GYNECOLOGY | Facility: CLINIC | Age: 44
End: 2019-01-18
Payer: COMMERCIAL

## 2019-01-18 VITALS
DIASTOLIC BLOOD PRESSURE: 60 MMHG | SYSTOLIC BLOOD PRESSURE: 120 MMHG | WEIGHT: 134.25 LBS | HEIGHT: 62 IN | BODY MASS INDEX: 24.7 KG/M2

## 2019-01-18 DIAGNOSIS — N39.3 SUI (STRESS URINARY INCONTINENCE, FEMALE): ICD-10-CM

## 2019-01-18 DIAGNOSIS — Z30.49 ENCOUNTER FOR SURVEILLANCE OF OTHER CONTRACEPTIVE: ICD-10-CM

## 2019-01-18 DIAGNOSIS — Z01.419 WOMEN'S ANNUAL ROUTINE GYNECOLOGICAL EXAMINATION: Primary | ICD-10-CM

## 2019-01-18 DIAGNOSIS — N81.6 RECTOCELE: ICD-10-CM

## 2019-01-18 PROCEDURE — 99999 PR PBB SHADOW E&M-EST. PATIENT-LVL III: ICD-10-PCS | Mod: PBBFAC,,, | Performed by: NURSE PRACTITIONER

## 2019-01-18 PROCEDURE — 99999 PR PBB SHADOW E&M-EST. PATIENT-LVL III: CPT | Mod: PBBFAC,,, | Performed by: NURSE PRACTITIONER

## 2019-01-18 PROCEDURE — 99396 PREV VISIT EST AGE 40-64: CPT | Mod: S$GLB,,, | Performed by: NURSE PRACTITIONER

## 2019-01-18 PROCEDURE — 99396 PR PREVENTIVE VISIT,EST,40-64: ICD-10-PCS | Mod: S$GLB,,, | Performed by: NURSE PRACTITIONER

## 2019-01-18 RX ORDER — NORETHINDRONE ACETATE AND ETHINYL ESTRADIOL 1MG-20(21)
1 KIT ORAL DAILY
Qty: 84 TABLET | Refills: 3 | Status: SHIPPED | OUTPATIENT
Start: 2019-01-18 | End: 2020-01-22 | Stop reason: SDUPTHER

## 2019-01-18 NOTE — PROGRESS NOTES
CC: Annual  HPI: Pt is a 43 y.o.  female who presents for routine annual exam. She has 3 children - oldest will be starting technical college in the fall. She works for [employer]. She uses OCPs for contraception. She does not want STD screening.  Reports SYMONE with sneezing, coughing, and laughing.  Reports bladder feels heavier when she puts on weight.  The patient participates in regular exercise: no.  The patient does not smoke.  The patient wears seatbelts.   Pt denies any domestic violence.  Mammo is UTD 1/2019 WNL.      FH:  Breast cancer: none  Colon cancer: none  Ovarian cancer: none  Endometrial cancer: none    ROS:  GENERAL: Feeling well overall. Denies fever or chills.   SKIN: Denies rash or lesions.   HEAD: Denies head injury or headache.   NODES: Denies enlarged lymph nodes.   CHEST: Denies chest pain or shortness of breath.   CARDIOVASCULAR: Denies palpitations or left sided chest pain.   ABDOMEN: No abdominal pain, constipation, diarrhea, nausea, vomiting or rectal bleeding.   URINARY: No dysuria, hematuria, or burning on urination.  REPRODUCTIVE: See HPI.   BREASTS: Denies pain, lumps, or nipple discharge.   HEMATOLOGIC: No easy bruisability or excessive bleeding.   MUSCULOSKELETAL: Denies joint pain or swelling.   NEUROLOGIC: Denies syncope or weakness.   PSYCHIATRIC: Denies depression, anxiety or mood swings.    PE:   APPEARANCE: Well nourished, well developed, White female in no acute distress.  NODES: no cervical, supraclavicular, or inguinal lymphadenopathy  BREASTS: Symmetrical, no skin changes or visible lesions. No palpable masses, nipple discharge or adenopathy bilaterally.  ABDOMEN: Soft. No tenderness or masses. No distention. No hernias palpated. No CVA tenderness.  VULVA: No lesions. Normal external female genitalia.  URETHRAL MEATUS: Normal size and location, no lesions, no prolapse.  URETHRA: No masses, tenderness, or prolapse.  VAGINA: Moist. No lesions or lacerations noted. No  abnormal discharge present. No odor present.   CERVIX: No lesions or discharge. No cervical motion tenderness.   UTERUS: Normal size, regular shape, mobile, non-tender. + mild rectocele  ADNEXA: No tenderness. No fullness or masses palpated in the adnexal regions.   ANUS PERINEUM: Normal.      Diagnosis:  1. Women's annual routine gynecological examination    2. SYMONE (stress urinary incontinence, female)    3. Rectocele    4. Encounter for surveillance of other contraceptive        Plan:   Pap not indicated - last pap 2/2017 WNL  OCPs refilled  Mammo in 1/2020  Discussed interventions for SYMONE- timed voiding, decreasing bladder irritants, and Kegel exercise.   Discussed referral to uro gyn for evaluation of SYMONE- declines at present will contact clinic if worsens     Orders Placed This Encounter    norethindrone-ethinyl estradiol (MICROGESTIN FE 1/20, 28,) 1 mg-20 mcg (21)/75 mg (7) per tablet       Patient was counseled today on the new ACS guidelines for cervical cytology screening as well as the current recommendations for breast cancer screening. She was counseled to follow up with her PCP for other routine health maintenance. Counseling session lasted approximately 10 minutes, and all her questions were answered.    Follow-up with me in 1 year for routine exam    DOC Sr

## 2019-01-29 DIAGNOSIS — Z30.49 ENCOUNTER FOR SURVEILLANCE OF OTHER CONTRACEPTIVE: ICD-10-CM

## 2019-01-29 RX ORDER — NORETHINDRONE ACETATE AND ETHINYL ESTRADIOL AND FERROUS FUMARATE 1MG-20(21)
KIT ORAL
Qty: 28 TABLET | Refills: 1 | Status: SHIPPED | OUTPATIENT
Start: 2019-01-29 | End: 2019-03-26 | Stop reason: SDUPTHER

## 2019-03-26 DIAGNOSIS — Z30.49 ENCOUNTER FOR SURVEILLANCE OF OTHER CONTRACEPTIVE: ICD-10-CM

## 2019-03-27 RX ORDER — NORETHINDRONE ACETATE AND ETHINYL ESTRADIOL AND FERROUS FUMARATE 1MG-20(21)
KIT ORAL
Qty: 28 TABLET | Refills: 1 | Status: SHIPPED | OUTPATIENT
Start: 2019-03-27 | End: 2019-05-21 | Stop reason: SDUPTHER

## 2019-05-21 DIAGNOSIS — Z30.49 ENCOUNTER FOR SURVEILLANCE OF OTHER CONTRACEPTIVE: ICD-10-CM

## 2019-05-21 RX ORDER — NORETHINDRONE ACETATE AND ETHINYL ESTRADIOL AND FERROUS FUMARATE 1MG-20(21)
KIT ORAL
Qty: 28 TABLET | Refills: 1 | Status: SHIPPED | OUTPATIENT
Start: 2019-05-21 | End: 2020-01-22

## 2019-06-07 ENCOUNTER — PATIENT MESSAGE (OUTPATIENT)
Dept: OBSTETRICS AND GYNECOLOGY | Facility: CLINIC | Age: 44
End: 2019-06-07

## 2020-01-22 ENCOUNTER — OFFICE VISIT (OUTPATIENT)
Dept: OBSTETRICS AND GYNECOLOGY | Facility: CLINIC | Age: 45
End: 2020-01-22
Payer: COMMERCIAL

## 2020-01-22 VITALS
SYSTOLIC BLOOD PRESSURE: 122 MMHG | WEIGHT: 135.38 LBS | DIASTOLIC BLOOD PRESSURE: 82 MMHG | BODY MASS INDEX: 24.76 KG/M2

## 2020-01-22 DIAGNOSIS — N92.1 BREAKTHROUGH BLEEDING ON BIRTH CONTROL PILLS: ICD-10-CM

## 2020-01-22 DIAGNOSIS — Z11.51 SCREENING FOR HPV (HUMAN PAPILLOMAVIRUS): ICD-10-CM

## 2020-01-22 DIAGNOSIS — Z12.4 ENCOUNTER FOR PAPANICOLAOU SMEAR FOR CERVICAL CANCER SCREENING: ICD-10-CM

## 2020-01-22 DIAGNOSIS — Z01.419 WOMEN'S ANNUAL ROUTINE GYNECOLOGICAL EXAMINATION: Primary | ICD-10-CM

## 2020-01-22 DIAGNOSIS — Z30.49 ENCOUNTER FOR SURVEILLANCE OF OTHER CONTRACEPTIVE: ICD-10-CM

## 2020-01-22 DIAGNOSIS — Z12.31 ENCOUNTER FOR SCREENING MAMMOGRAM FOR BREAST CANCER: ICD-10-CM

## 2020-01-22 LAB
B-HCG UR QL: NEGATIVE
CTP QC/QA: YES

## 2020-01-22 PROCEDURE — 87624 HPV HI-RISK TYP POOLED RSLT: CPT

## 2020-01-22 PROCEDURE — 81025 POCT URINE PREGNANCY: ICD-10-PCS | Mod: S$GLB,,, | Performed by: NURSE PRACTITIONER

## 2020-01-22 PROCEDURE — 99999 PR PBB SHADOW E&M-EST. PATIENT-LVL III: CPT | Mod: PBBFAC,,, | Performed by: NURSE PRACTITIONER

## 2020-01-22 PROCEDURE — 88175 CYTOPATH C/V AUTO FLUID REDO: CPT

## 2020-01-22 PROCEDURE — 99396 PR PREVENTIVE VISIT,EST,40-64: ICD-10-PCS | Mod: S$GLB,,, | Performed by: NURSE PRACTITIONER

## 2020-01-22 PROCEDURE — 99396 PREV VISIT EST AGE 40-64: CPT | Mod: S$GLB,,, | Performed by: NURSE PRACTITIONER

## 2020-01-22 PROCEDURE — 99999 PR PBB SHADOW E&M-EST. PATIENT-LVL III: ICD-10-PCS | Mod: PBBFAC,,, | Performed by: NURSE PRACTITIONER

## 2020-01-22 PROCEDURE — 81025 URINE PREGNANCY TEST: CPT | Mod: S$GLB,,, | Performed by: NURSE PRACTITIONER

## 2020-01-22 RX ORDER — NORETHINDRONE ACETATE AND ETHINYL ESTRADIOL 1MG-20(21)
1 KIT ORAL DAILY
Qty: 84 TABLET | Refills: 3 | Status: SHIPPED | OUTPATIENT
Start: 2020-01-22 | End: 2020-03-02

## 2020-01-22 NOTE — PROGRESS NOTES
CC: Annual  HPI: Pt is a 44 y.o.  female who presents for routine annual exam. She uses OCPs for contraception. She does not want STD screening.  Reports she had some BTGB on her last 2 menstral cycles.  Denies any missed or late pills. No new sexual partners.  Reports had some RLQ pain with the BTB- this has now resolved.  UPT is negative.  She is in need of a screening mammo- last 1/19 WNL.    FH:  Breast cancer: none  Colon cancer: none  Ovarian cancer: none  Endometrial cancer: none    ROS:  GENERAL: Feeling well overall. Denies fever or chills.   SKIN: Denies rash or lesions.   HEAD: Denies head injury or headache.   NODES: Denies enlarged lymph nodes.   CHEST: Denies chest pain or shortness of breath.   CARDIOVASCULAR: Denies palpitations or left sided chest pain.   ABDOMEN: No abdominal pain, constipation, diarrhea, nausea, vomiting or rectal bleeding.   URINARY: No dysuria, hematuria, or burning on urination.  REPRODUCTIVE: See HPI.   BREASTS: Denies pain, lumps, or nipple discharge.   HEMATOLOGIC: No easy bruisability or excessive bleeding.   MUSCULOSKELETAL: Denies joint pain or swelling.   NEUROLOGIC: Denies syncope or weakness.   PSYCHIATRIC: Denies depression, anxiety or mood swings.    PE:   APPEARANCE: Well nourished, well developed, White female in no acute distress.  NODES: no cervical, supraclavicular, or inguinal lymphadenopathy  BREASTS: Symmetrical, no skin changes or visible lesions. No palpable masses, nipple discharge or adenopathy bilaterally.  ABDOMEN: Soft. No tenderness or masses. No distention. No hernias palpated. No CVA tenderness.  VULVA: No lesions. Normal external female genitalia.  URETHRAL MEATUS: Normal size and location, no lesions, no prolapse.  URETHRA: No masses, tenderness, or prolapse.  VAGINA: Moist. No lesions or lacerations noted. No abnormal discharge present. No odor present.   CERVIX: No lesions or discharge. No cervical motion tenderness.   UTERUS: Normal size,  regular shape, mobile, non-tender.  ADNEXA: No tenderness. No fullness or masses palpated in the adnexal regions.   ANUS PERINEUM: Normal.      Diagnosis:  1. Women's annual routine gynecological examination    2. Encounter for Papanicolaou smear for cervical cancer screening    3. Screening for HPV (human papillomavirus)    4. Encounter for screening mammogram for breast cancer    5. Breakthrough bleeding on birth control pills    6. Encounter for surveillance of other contraceptive        Plan:   Pap/ HPV  Mammo   UPT is negative  Discussed to notify clinic if BTB returns or if pelvic pain returns and will obtain pelvic US for eval  Discussed can increase to a higher mcg dosage if needed to help control BTB     Orders Placed This Encounter    HPV High Risk Genotypes, PCR    Mammo Digital Screening Bilat w/ Mateo    POCT Urine Pregnancy    Liquid-Based Pap Smear, Screening    norethindrone-ethinyl estradiol (MICROGESTIN FE 1/20, 28,) 1 mg-20 mcg (21)/75 mg (7) per tablet       Patient was counseled today on the new ACS guidelines for cervical cytology screening as well as the current recommendations for breast cancer screening. She was counseled to follow up with her PCP for other routine health maintenance. Counseling session lasted approximately 10 minutes, and all her questions were answered.    Follow-up with me in 1 year for routine exam    DOC Sr

## 2020-01-28 LAB
HPV HR 12 DNA SPEC QL NAA+PROBE: NEGATIVE
HPV16 AG SPEC QL: NEGATIVE
HPV18 DNA SPEC QL NAA+PROBE: NEGATIVE

## 2020-01-31 ENCOUNTER — HOSPITAL ENCOUNTER (OUTPATIENT)
Dept: RADIOLOGY | Facility: HOSPITAL | Age: 45
Discharge: HOME | End: 2020-01-31
Attending: NURSE PRACTITIONER
Payer: COMMERCIAL

## 2020-01-31 DIAGNOSIS — Z12.31 ENCOUNTER FOR SCREENING MAMMOGRAM FOR BREAST CANCER: ICD-10-CM

## 2020-01-31 PROCEDURE — 77063 MAMMO DIGITAL SCREENING BILAT WITH TOMOSYNTHESIS_CAD: ICD-10-PCS | Mod: 26,,, | Performed by: RADIOLOGY

## 2020-01-31 PROCEDURE — 77067 SCR MAMMO BI INCL CAD: CPT | Mod: 26,,, | Performed by: RADIOLOGY

## 2020-01-31 PROCEDURE — 77063 BREAST TOMOSYNTHESIS BI: CPT | Mod: 26,,, | Performed by: RADIOLOGY

## 2020-01-31 PROCEDURE — 77067 SCR MAMMO BI INCL CAD: CPT | Mod: TC

## 2020-01-31 PROCEDURE — 77067 MAMMO DIGITAL SCREENING BILAT WITH TOMOSYNTHESIS_CAD: ICD-10-PCS | Mod: 26,,, | Performed by: RADIOLOGY

## 2020-02-12 ENCOUNTER — PATIENT MESSAGE (OUTPATIENT)
Dept: OBSTETRICS AND GYNECOLOGY | Facility: CLINIC | Age: 45
End: 2020-02-12

## 2020-02-12 DIAGNOSIS — R10.2 PELVIC PAIN: Primary | ICD-10-CM

## 2020-02-12 LAB
FINAL PATHOLOGIC DIAGNOSIS: NORMAL
Lab: NORMAL

## 2020-02-13 ENCOUNTER — PATIENT MESSAGE (OUTPATIENT)
Dept: OBSTETRICS AND GYNECOLOGY | Facility: CLINIC | Age: 45
End: 2020-02-13

## 2020-02-14 ENCOUNTER — HOSPITAL ENCOUNTER (OUTPATIENT)
Dept: RADIOLOGY | Facility: OTHER | Age: 45
Discharge: HOME | End: 2020-02-14
Attending: NURSE PRACTITIONER
Payer: COMMERCIAL

## 2020-02-14 ENCOUNTER — TELEPHONE (OUTPATIENT)
Dept: OBSTETRICS AND GYNECOLOGY | Facility: CLINIC | Age: 45
End: 2020-02-14

## 2020-02-14 DIAGNOSIS — R10.2 PELVIC PAIN: ICD-10-CM

## 2020-02-14 PROCEDURE — 76856 US EXAM PELVIC COMPLETE: CPT | Mod: 26,,, | Performed by: INTERNAL MEDICINE

## 2020-02-14 PROCEDURE — 76830 US PELVIS COMP WITH TRANSVAG NON-OB (XPD): ICD-10-PCS | Mod: 26,,, | Performed by: INTERNAL MEDICINE

## 2020-02-14 PROCEDURE — 76856 US PELVIS COMP WITH TRANSVAG NON-OB (XPD): ICD-10-PCS | Mod: 26,,, | Performed by: INTERNAL MEDICINE

## 2020-02-14 PROCEDURE — 76830 TRANSVAGINAL US NON-OB: CPT | Mod: 26,,, | Performed by: INTERNAL MEDICINE

## 2020-02-14 PROCEDURE — 76830 TRANSVAGINAL US NON-OB: CPT | Mod: TC

## 2020-02-17 ENCOUNTER — PATIENT MESSAGE (OUTPATIENT)
Dept: OBSTETRICS AND GYNECOLOGY | Facility: CLINIC | Age: 45
End: 2020-02-17

## 2020-03-02 ENCOUNTER — OFFICE VISIT (OUTPATIENT)
Dept: OBSTETRICS AND GYNECOLOGY | Facility: CLINIC | Age: 45
End: 2020-03-02
Attending: OBSTETRICS & GYNECOLOGY
Payer: COMMERCIAL

## 2020-03-02 VITALS
HEIGHT: 62 IN | BODY MASS INDEX: 25.28 KG/M2 | SYSTOLIC BLOOD PRESSURE: 122 MMHG | DIASTOLIC BLOOD PRESSURE: 80 MMHG | WEIGHT: 137.38 LBS

## 2020-03-02 DIAGNOSIS — N92.1 MENOMETRORRHAGIA: Primary | ICD-10-CM

## 2020-03-02 DIAGNOSIS — N92.1 BREAKTHROUGH BLEEDING ON BIRTH CONTROL PILLS: ICD-10-CM

## 2020-03-02 PROCEDURE — 88305 TISSUE EXAM BY PATHOLOGIST: CPT | Performed by: PATHOLOGY

## 2020-03-02 PROCEDURE — 88305 TISSUE EXAM BY PATHOLOGIST: ICD-10-PCS | Mod: 26,,, | Performed by: PATHOLOGY

## 2020-03-02 PROCEDURE — 99999 PR PBB SHADOW E&M-EST. PATIENT-LVL III: ICD-10-PCS | Mod: PBBFAC,,, | Performed by: OBSTETRICS & GYNECOLOGY

## 2020-03-02 PROCEDURE — 58100 BIOPSY OF UTERUS LINING: CPT | Mod: S$GLB,,, | Performed by: OBSTETRICS & GYNECOLOGY

## 2020-03-02 PROCEDURE — 99213 PR OFFICE/OUTPT VISIT, EST, LEVL III, 20-29 MIN: ICD-10-PCS | Mod: 25,S$GLB,, | Performed by: OBSTETRICS & GYNECOLOGY

## 2020-03-02 PROCEDURE — 58100 PR BIOPSY OF UTERUS LINING: ICD-10-PCS | Mod: S$GLB,,, | Performed by: OBSTETRICS & GYNECOLOGY

## 2020-03-02 PROCEDURE — 99999 PR PBB SHADOW E&M-EST. PATIENT-LVL III: CPT | Mod: PBBFAC,,, | Performed by: OBSTETRICS & GYNECOLOGY

## 2020-03-02 PROCEDURE — 99213 OFFICE O/P EST LOW 20 MIN: CPT | Mod: 25,S$GLB,, | Performed by: OBSTETRICS & GYNECOLOGY

## 2020-03-02 PROCEDURE — 88305 TISSUE EXAM BY PATHOLOGIST: CPT | Mod: 26,,, | Performed by: PATHOLOGY

## 2020-03-02 RX ORDER — NORETHINDRONE ACETATE AND ETHINYL ESTRADIOL 1.5-30(21)
1 KIT ORAL DAILY
Qty: 84 TABLET | Refills: 2 | Status: ON HOLD | OUTPATIENT
Start: 2020-03-02 | End: 2020-07-15 | Stop reason: HOSPADM

## 2020-03-02 NOTE — PROGRESS NOTES
HISTORY OF PRESENT ILLNESS:    Christelle Reyes is a 44 y.o. female, , Patient's last menstrual period was 2020 (approximate).,  presents for consult for menometrorrhagia.     Cycles progressively heavier last few years.  For the last year has had breakthrough bleeding that lasts up to 2 weeks.  Occasionally has RLQ pain about group home through pill pack that usually correlates with breakthrough bleeding.    She has 3 children and does not desire future fertility.    History reviewed. No pertinent past medical history.    Past Surgical History:   Procedure Laterality Date    MOUTH SURGERY         MEDICATIONS AND ALLERGIES:      Current Outpatient Medications:     ibuprofen (ADVIL,MOTRIN) 100 MG tablet, Take 100 mg by mouth every 6 (six) hours as needed., Disp: , Rfl:     loratadine (CLARITIN) 10 mg tablet, Take 10 mg by mouth once daily., Disp: , Rfl:     FLUZONE QUAD 2245-5299, PF, 60 mcg (15 mcg x 4)/0.5 mL Syrg, TO BE ADMINISTERED BY PHARMACIST FOR IMMUNIZATION, Disp: , Rfl:     norethindrone-ethinyl estradiol-iron (MICROGESTIN FE1.5/30) 1.5 mg-30 mcg (21)/75 mg (7) tablet, Take 1 tablet by mouth once daily., Disp: 84 tablet, Rfl: 2    Review of patient's allergies indicates:   Allergen Reactions    Codeine      Other reaction(s): Hallucinations       Family History   Problem Relation Age of Onset    Brain cancer Paternal Grandfather     Thyroid disease Maternal Grandfather     Hypertension Father     Diabetes Father     Melanoma Maternal Aunt     Skin cancer Maternal Uncle     Glaucoma Maternal Grandmother     Cataracts Maternal Grandmother     Hyperlipidemia Mother     Hyperthyroidism Sister     Amblyopia Neg Hx     Blindness Neg Hx     Cancer Neg Hx     Macular degeneration Neg Hx     Retinal detachment Neg Hx     Strabismus Neg Hx     Stroke Neg Hx     Breast cancer Neg Hx     Colon cancer Neg Hx     Ovarian cancer Neg Hx        Social History     Socioeconomic  "History    Marital status:      Spouse name: Not on file    Number of children: Not on file    Years of education: Not on file    Highest education level: Not on file   Occupational History    Not on file   Social Needs    Financial resource strain: Not on file    Food insecurity:     Worry: Not on file     Inability: Not on file    Transportation needs:     Medical: Not on file     Non-medical: Not on file   Tobacco Use    Smoking status: Never Smoker    Smokeless tobacco: Never Used   Substance and Sexual Activity    Alcohol use: Yes     Alcohol/week: 0.0 standard drinks     Comment: rarely    Drug use: No    Sexual activity: Yes     Partners: Male     Birth control/protection: OCP   Lifestyle    Physical activity:     Days per week: Not on file     Minutes per session: Not on file    Stress: Not on file   Relationships    Social connections:     Talks on phone: Not on file     Gets together: Not on file     Attends Jehovah's witness service: Not on file     Active member of club or organization: Not on file     Attends meetings of clubs or organizations: Not on file     Relationship status: Not on file   Other Topics Concern    Not on file   Social History Narrative    Not on file       ROS:  GENERAL: No weight changes. No swelling. No fatigue. No fever.  CARDIOVASCULAR: No chest pain. No shortness of breath. No leg cramps.   NEUROLOGICAL: No headaches. No vision changes.  BREASTS: No pain. No lumps. No discharge.  ABDOMEN: No pain. No nausea. No vomiting. No diarrhea. No constipation.  REPRODUCTIVE: No abnormal bleeding.   VULVA: No pain. No lesions. No itching.  VAGINA: No relaxation. No itching. No odor. No discharge. No lesions.  URINARY: No incontinence. No nocturia. No frequency. No dysuria.    /80   Ht 5' 2" (1.575 m)   Wt 62.3 kg (137 lb 5.6 oz)   LMP 02/12/2020 (Approximate)   BMI 25.12 kg/m²     PE:  APPEARANCE: Well nourished, well developed, in no acute distress.  ABDOMEN: " Soft. No tenderness or masses. No hepatosplenomegaly. No hernias.  BREASTS, FUNDOSCOPIC, RECTAL DEFERRED  PELVIC: External female genitalia without lesions.  Female hair distribution. Adequate perineal body, Normal urethral meatus. Vagina moist and well rugated without lesions or discharge.  No significant cystocele or rectocele present. Cervix pink without lesions, discharge or tenderness. Uterus is normal size, regular, mobile and nontender. Adnexa without masses or tenderness.  EXTREMITIES: No edema      DIAGNOSIS & PLAN  1. Menometrorrhagia  CBC auto differential    TSH    Specimen to Pathology, Ob/Gyn   2. Breakthrough bleeding on birth control pills         COUNSELING:  ENDOMETRIAL BIOPSY:    The patient was informed of the risk of bleeding, infection, uterine perforation, and pain.  She was counseled that the test will rule-out endometrial cancer with an accuracy greater than 96%.  She was counseled on the alternatives to endometrial biopsy and agrees to proceed.  A time out was performed.  Urine pregnancy test was negative.    The cervix was visualized with a speculum.  The cervix was swabbed with Betadinex3 times 3.  The anterior lip of the cervix was grasped with a single toothed tenaculum.  A uterine pipelle was inserted into the uterus, and the uterus sounded to  8cm. The tenaculum and speculum were removed, and the specimen was sent to Pathology for histologic evaluation.   The patient tolerated with above procedure well.    Post Endometrial Biopsy counseling:  The patient was instructed to manage post-biopsy cramping with NSAIDs or Tylenol.  She was counseled to expect spotting or light bleeding for a few days, and she was counseled to report heavy bleeding, fever greater then 101.0F, worsening pain, or foul-smelling vaginal discharge.  Counseling lasted about 15 minutes, and after counseling she had no further questions.       Will try higher OCP dose.  Discussed options including HSC/D&C vs. hyst if  abnormal bleeding persists.

## 2020-03-02 NOTE — LETTER
March 2, 2020      No Recipients           [facility] [id number] [facility] [address] [facility] [address]  [address] [address]  Phone: [phone]  Fax: [phone]          Patient: Christelle Reyes   MR Number: [id number]   YOB: 1975   Date of Visit: 3/2/2020       Dear :    Thank you for referring Christelle Reyes to me for evaluation. Attached you will find relevant portions of my assessment and plan of care.    If you have questions, please do not hesitate to call me. I look forward to following Christelle Reyes along with you.    Sincerely,    Aurora Brown MD    Enclosure  CC:  No Recipients    If you would like to receive this communication electronically, please contact [email or web address] or [phone] to request more information on [employer] Link access.    For providers and/or their staff who would like to refer a patient to [facility], please contact us through our one-stop-shop provider referral line, Arlene Rachel, at [phone].    If you feel you have received this communication in error or would no longer like to receive these types of communications, please e-mail [email or web address]

## 2020-03-03 ENCOUNTER — LAB VISIT (OUTPATIENT)
Dept: LAB | Facility: HOSPITAL | Age: 45
End: 2020-03-03
Payer: COMMERCIAL

## 2020-03-03 DIAGNOSIS — N92.1 MENOMETRORRHAGIA: ICD-10-CM

## 2020-03-03 LAB
BASOPHILS # BLD AUTO: 0.05 K/UL (ref 0–0.2)
BASOPHILS NFR BLD: 0.8 % (ref 0–1.9)
DIFFERENTIAL METHOD: ABNORMAL
EOSINOPHIL # BLD AUTO: 0.3 K/UL (ref 0–0.5)
EOSINOPHIL NFR BLD: 4.5 % (ref 0–8)
ERYTHROCYTE [DISTWIDTH] IN BLOOD BY AUTOMATED COUNT: 12.4 % (ref 11.5–14.5)
HCT VFR BLD AUTO: 40.7 % (ref 37–48.5)
HGB BLD-MCNC: 13 G/DL (ref 12–16)
IMM GRANULOCYTES # BLD AUTO: 0.02 K/UL (ref 0–0.04)
IMM GRANULOCYTES NFR BLD AUTO: 0.3 % (ref 0–0.5)
LYMPHOCYTES # BLD AUTO: 2.2 K/UL (ref 1–4.8)
LYMPHOCYTES NFR BLD: 33.7 % (ref 18–48)
MCH RBC QN AUTO: 29.7 PG (ref 27–31)
MCHC RBC AUTO-ENTMCNC: 31.9 G/DL (ref 32–36)
MCV RBC AUTO: 93 FL (ref 82–98)
MONOCYTES # BLD AUTO: 0.6 K/UL (ref 0.3–1)
MONOCYTES NFR BLD: 8.6 % (ref 4–15)
NEUTROPHILS # BLD AUTO: 3.5 K/UL (ref 1.8–7.7)
NEUTROPHILS NFR BLD: 52.1 % (ref 38–73)
NRBC BLD-RTO: 0 /100 WBC
PLATELET # BLD AUTO: 190 K/UL (ref 150–350)
PMV BLD AUTO: 11.8 FL (ref 9.2–12.9)
RBC # BLD AUTO: 4.37 M/UL (ref 4–5.4)
TSH SERPL DL<=0.005 MIU/L-ACNC: 2.72 UIU/ML (ref 0.4–4)
WBC # BLD AUTO: 6.62 K/UL (ref 3.9–12.7)

## 2020-03-03 PROCEDURE — 36415 COLL VENOUS BLD VENIPUNCTURE: CPT | Mod: PO

## 2020-03-03 PROCEDURE — 84443 ASSAY THYROID STIM HORMONE: CPT

## 2020-03-03 PROCEDURE — 85025 COMPLETE CBC W/AUTO DIFF WBC: CPT

## 2020-03-10 LAB — FINAL PATHOLOGIC DIAGNOSIS: NORMAL

## 2020-03-11 ENCOUNTER — PATIENT MESSAGE (OUTPATIENT)
Dept: OBSTETRICS AND GYNECOLOGY | Facility: CLINIC | Age: 45
End: 2020-03-11

## 2020-03-12 ENCOUNTER — PATIENT MESSAGE (OUTPATIENT)
Dept: OBSTETRICS AND GYNECOLOGY | Facility: CLINIC | Age: 45
End: 2020-03-12

## 2020-04-28 ENCOUNTER — PATIENT MESSAGE (OUTPATIENT)
Dept: OBSTETRICS AND GYNECOLOGY | Facility: CLINIC | Age: 45
End: 2020-04-28

## 2020-04-28 DIAGNOSIS — N92.1 MENOMETRORRHAGIA: Primary | ICD-10-CM

## 2020-04-28 DIAGNOSIS — R10.2 PELVIC PAIN: ICD-10-CM

## 2020-05-20 ENCOUNTER — PATIENT MESSAGE (OUTPATIENT)
Dept: SURGERY | Facility: OTHER | Age: 45
End: 2020-05-20

## 2020-05-20 DIAGNOSIS — N39.46 URINARY INCONTINENCE, MIXED: Primary | ICD-10-CM

## 2020-05-25 NOTE — TELEPHONE ENCOUNTER
Spoke to pt, consult appointment offered 5/26/2020 at the [facility] location, pt declined stating she has an eye appointment on 5/26/2020 in the a.m and her eyes will be dilated. Consult scheduled 6/19/2020, pt is aware and verbalizes understanding.

## 2020-05-25 NOTE — TELEPHONE ENCOUNTER
Spoke with patient.  She is having SYMONE > UUI.  Discussed doing SUDS, possibly signing consents same day as her clinic visit 6/19.  She is ok with this plan.  SUDS/cysto ordered.  Will see if can add to schedule

## 2020-06-19 ENCOUNTER — PROCEDURE VISIT (OUTPATIENT)
Dept: UROGYNECOLOGY | Facility: CLINIC | Age: 45
End: 2020-06-19
Payer: COMMERCIAL

## 2020-06-19 ENCOUNTER — INITIAL CONSULT (OUTPATIENT)
Dept: UROGYNECOLOGY | Facility: CLINIC | Age: 45
End: 2020-06-19
Payer: COMMERCIAL

## 2020-06-19 VITALS
BODY MASS INDEX: 21 KG/M2 | HEIGHT: 67 IN | SYSTOLIC BLOOD PRESSURE: 116 MMHG | DIASTOLIC BLOOD PRESSURE: 80 MMHG | WEIGHT: 133.81 LBS

## 2020-06-19 VITALS — HEIGHT: 62 IN | DIASTOLIC BLOOD PRESSURE: 84 MMHG | SYSTOLIC BLOOD PRESSURE: 128 MMHG | BODY MASS INDEX: 24.48 KG/M2

## 2020-06-19 DIAGNOSIS — N81.6 RECTOCELE, FEMALE: ICD-10-CM

## 2020-06-19 DIAGNOSIS — N93.9 ABNORMAL UTERINE BLEEDING: ICD-10-CM

## 2020-06-19 DIAGNOSIS — K59.09 CHRONIC CONSTIPATION: ICD-10-CM

## 2020-06-19 DIAGNOSIS — N39.46 URINARY INCONTINENCE, MIXED: ICD-10-CM

## 2020-06-19 DIAGNOSIS — N39.46 URINARY INCONTINENCE, MIXED: Primary | ICD-10-CM

## 2020-06-19 DIAGNOSIS — N81.4 UTEROVAGINAL PROLAPSE: ICD-10-CM

## 2020-06-19 DIAGNOSIS — N81.11 CYSTOCELE, MIDLINE: ICD-10-CM

## 2020-06-19 DIAGNOSIS — R35.1 NOCTURIA MORE THAN TWICE PER NIGHT: ICD-10-CM

## 2020-06-19 LAB
BILIRUB SERPL-MCNC: NORMAL MG/DL
BLOOD URINE, POC: NORMAL
CLARITY, POC UA: CLEAR
COLOR, POC UA: YELLOW
GLUCOSE UR QL STRIP: NORMAL
KETONES UR QL STRIP: NORMAL
LEUKOCYTE ESTERASE URINE, POC: NORMAL
NITRITE, POC UA: NORMAL
PH, POC UA: 7
PROTEIN, POC: NORMAL
SPECIFIC GRAVITY, POC UA: 1
UROBILINOGEN, POC UA: NORMAL

## 2020-06-19 PROCEDURE — 99999 PR PBB SHADOW E&M-EST. PATIENT-LVL V: ICD-10-PCS | Mod: PBBFAC,,, | Performed by: OBSTETRICS & GYNECOLOGY

## 2020-06-19 PROCEDURE — 99215 OFFICE O/P EST HI 40 MIN: CPT | Mod: 25,S$GLB,, | Performed by: OBSTETRICS & GYNECOLOGY

## 2020-06-19 PROCEDURE — 52000 PR CYSTOURETHROSCOPY: ICD-10-PCS | Mod: 59,S$GLB,, | Performed by: OBSTETRICS & GYNECOLOGY

## 2020-06-19 PROCEDURE — 51797 PR VOIDING PRESS STUDY INTRA-ABDOMINAL VOID: ICD-10-PCS | Mod: S$GLB,,, | Performed by: OBSTETRICS & GYNECOLOGY

## 2020-06-19 PROCEDURE — 51784 PR ANAL/URINARY MUSCLE STUDY: ICD-10-PCS | Mod: 51,S$GLB,, | Performed by: OBSTETRICS & GYNECOLOGY

## 2020-06-19 PROCEDURE — 51741 ELECTRO-UROFLOWMETRY FIRST: CPT | Mod: 51,S$GLB,, | Performed by: OBSTETRICS & GYNECOLOGY

## 2020-06-19 PROCEDURE — 81002 POCT URINE DIPSTICK WITHOUT MICROSCOPE: ICD-10-PCS | Mod: S$GLB,,, | Performed by: OBSTETRICS & GYNECOLOGY

## 2020-06-19 PROCEDURE — 51701 INSERT BLADDER CATHETER: CPT | Mod: S$GLB,,, | Performed by: OBSTETRICS & GYNECOLOGY

## 2020-06-19 PROCEDURE — 99215 PR OFFICE/OUTPT VISIT, EST, LEVL V, 40-54 MIN: ICD-10-PCS | Mod: 25,S$GLB,, | Performed by: OBSTETRICS & GYNECOLOGY

## 2020-06-19 PROCEDURE — 99999 PR PBB SHADOW E&M-EST. PATIENT-LVL V: CPT | Mod: PBBFAC,,, | Performed by: OBSTETRICS & GYNECOLOGY

## 2020-06-19 PROCEDURE — 52000 CYSTOURETHROSCOPY: CPT | Mod: 59,S$GLB,, | Performed by: OBSTETRICS & GYNECOLOGY

## 2020-06-19 PROCEDURE — 81002 URINALYSIS NONAUTO W/O SCOPE: CPT | Mod: S$GLB,,, | Performed by: OBSTETRICS & GYNECOLOGY

## 2020-06-19 PROCEDURE — 87086 URINE CULTURE/COLONY COUNT: CPT

## 2020-06-19 PROCEDURE — 51728 PR COMPLEX CYSTOMETROGRAM VOIDING PRESSURE STUDIES: ICD-10-PCS | Mod: S$GLB,,, | Performed by: OBSTETRICS & GYNECOLOGY

## 2020-06-19 PROCEDURE — 51784 ANAL/URINARY MUSCLE STUDY: CPT | Mod: 51,S$GLB,, | Performed by: OBSTETRICS & GYNECOLOGY

## 2020-06-19 PROCEDURE — 51728 CYSTOMETROGRAM W/VP: CPT | Mod: S$GLB,,, | Performed by: OBSTETRICS & GYNECOLOGY

## 2020-06-19 PROCEDURE — 51797 INTRAABDOMINAL PRESSURE TEST: CPT | Mod: S$GLB,,, | Performed by: OBSTETRICS & GYNECOLOGY

## 2020-06-19 PROCEDURE — 51701 PR INSERTION OF NON-INDWELLING BLADDER CATHETERIZATION FOR RESIDUAL UR: ICD-10-PCS | Mod: S$GLB,,, | Performed by: OBSTETRICS & GYNECOLOGY

## 2020-06-19 PROCEDURE — 51741 PR UROFLOWMETRY, COMPLEX: ICD-10-PCS | Mod: 51,S$GLB,, | Performed by: OBSTETRICS & GYNECOLOGY

## 2020-06-19 RX ORDER — CIPROFLOXACIN 500 MG/1
500 TABLET ORAL
Status: COMPLETED | OUTPATIENT
Start: 2020-06-19 | End: 2020-06-19

## 2020-06-19 RX ORDER — LIDOCAINE HYDROCHLORIDE 20 MG/ML
JELLY TOPICAL
Status: COMPLETED | OUTPATIENT
Start: 2020-06-19 | End: 2020-06-19

## 2020-06-19 RX ADMIN — CIPROFLOXACIN 500 MG: 500 TABLET ORAL at 12:06

## 2020-06-19 RX ADMIN — LIDOCAINE HYDROCHLORIDE 5 ML: 20 JELLY TOPICAL at 12:06

## 2020-06-19 NOTE — PATIENT INSTRUCTIONS
Bladder Irritants  Certain foods and drinks have been associated with worsening symptoms of urinary frequency, urgency, urge incontinence, or bladder pain. If you suffer from any of these conditions, you may wish to try eliminating one or more of these foods from your diet and see if your symptoms improve. If bladder symptoms are related to dietary factors, strict adherence to a diet thateliminates the food should bring marked relief in 10 days. Once you are feeling better, you can begin to add foods back into your diet, one at a time. If symptoms return, you will be able to identify the irritant. As you add foods back to your diet it is very important that you drink significant amounts of water.    -----------------------------------------------------------------------------------------------  List of Common Bladder Irritants*  Alcoholic beverages  Apples and apple juice  Cantaloupe  Carbonated beverages  Chili and spicy foods  Chocolate  Citrus fruit  Coffee (including decaffeinated)  Cranberries and cranberry juice  Grapes  Guava  Milk Products: milk, cheese, cottage cheese, yogurt, ice cream  Peaches  Pineapple  Plums  Strawberries  Sugar especially artificial sweeteners, saccharin, aspartame, corn sweeteners, honey, fructose, sucrose, lactose  Tea  Tomatoes and tomato juice  Vitamin B complex  Vinegar  *Most people are not sensitive to ALL of these products; your goal is to find the foods that make YOUR symptoms worse.  ---------------------------------------------------------------------------------------------------    Low-acid fruit substitutions include apricots, papaya, pears and watermelon. Coffee drinkers can drink Kava or other lowacid instant drinks. Tea drinkers can substitute non-citrus herbal and sun brewed teas. Calcium carbonate co-buffered with calcium ascorbate can be substituted for Vitamin C. Prelief is a dietary supplement that works as an acid blocker for the bladder.    Where to get more  information:        Overcoming Bladder Disorders by Yarelis Kay and Jerry Champion, 1990        You Dont Have to Live with Cystitis! By Magy Lai, 1988  · http://www.urologymanagement.org/oab    -----------------------------------------    Fiber Information Sheet  Your doctor has recommended that you follow a high fiber diet. The addition of fiber to your diet can make an enormous difference in your bowel control and regularity. Fiber helps people whether they lose stool or have trouble with constipation. Fiber works by bulking the stool and keeping it formed, yet making the movement soft and easy to pass. Fiber helps keep moisture within the stool so that neither diarrhea nor hard stool occurs. Fiber makes the bowels work more regularly, but it is not a laxative. An additional bonus from eating a high fiber diet is that your risk of cancer is reduced, too.    Most of us eat some high fiber foods already, but nearly all of us do not eat the necessary amount. For example, a slice of whole wheat bread contains only about 10% of the daily recommended amount of fiber. This means if you are relying on only whole wheat bread to meet the recommended fiber requirements, you would need to eat  between 10-18 slices every day! Please note that fiber is NOT in any meat or dairy product. It is only found in grains, vegetables and fruits. The recommended daily fiber intake is 20-25 grams. Foods having high fiber content include:     Fiber One Cereal, ½ cup 13.0 g   Manning beans, ¾ cup 10.4 g   Wheat bran cereal, 1 oz 10.0 g   Kidney beans, ¾ cup 9.3 g   All Bran Cereal, ½ cup 6.0 g   Oat Bran Cereal, hot, 1 oz 4.0 g   Banana, 1medium 3.8 g   Canned pears, ½ cup 3.7 g   3 prunes or ¼ cup raisins 3.5 g   Whole Wheat Total, 1 cup 3.0 g   Carrots, ½ cup 3.2 g   Apple, small 2.8 g   Broccoli, ½ cup 2.8 g   Cauliflower, ½ cup 2.6 g   Oatmeal, 1 oz 2.5 g   Whole Wheat Toast 2.0 g   Cheerios, 1  1/3 cup 2.0 g   Baked potato with skin 2.0 g   Corn, ½ cup 1.9 g   Popcorn, 3 cups 1.9 g   Orange, medium 1.9 g   Granola bar 1.0 g   Lettuce, ½ cup 0.9 g    If you dont think that you can get enough fiber through your everyday diet, there are many good fiber supplements you can take along with eating your high fiber diet. Some of these are: Metamucil (1 heaping teaspoon or 1-2 wafers), Citrucel (1 tablespoon), Fiberall (1-2 wafers or 1 teaspoon), Perdium (2 rounded teaspoons) and 1-2 teaspoons unprocessed bran (to mix with foods)    You may need to use the fiber supplement up to 3-4 times daily to produce normal elimination. Please follow specific package directions or call us for help in regulating the dose. You may notice some bloating and/or increased gas at first. These symptoms can be relieved by adding fiber to your diet slowly. Once your body gets used to this increased fiber, these symptoms will go away.   -------------------------------------------------    1)  Abnormal uterine bleeding:  --pelvic US 2/2020 fibroids  --EMBx 3/2020 benign  --planning hysterectomy 7/14/2020 with Dr. Brown    2)  Mixed urinary incontinence, urge > stress:    --urine C&S  --Empty bladder every 3 hours.  Empty well: wait a minute, lean forward on toilet.    --Avoid dietary irritants (see sheet).  Keep diary x 3-5 days to determine your irritants.  --KEGELS: do 10 in AM and 10 in PM, holding each x 10 seconds.  When you feel urge to go, STOP, KEGEL, and when urge has passed, then go to bathroom.  Consider PT in future.    --URGE: consider medication in future.  Takes 2-4 weeks to see if will have effect.  For dry mouth: get sour, sugar free lozenge or gum.    --STRESS:  Pessary vs. Sling. Urodynamics today: +SYMONE--add MUS to hysterectomy.     3)  Stage 2 cystocele/rectocele, stage 1 uterovaginal prolapse:  --discussed  --if surgery, would do uterosacral suspension + anterior/possible posterior repair    4)  History of  constipation:  Controlling constipation may help bladder urgency/leakage and fiber may better control cholesterol and blood glucose.  Start daily fiber.  Take 1 tsp of fiber powder (psyllium or other sugar-free powder).  Mix in 8 oz of water.  Take x 3-5 days.  Then, increase fiber by 1 tsp every 3-5 days until stool is easy to pass.  Stop and continue at that dose.   Do not exceed 6 tsps/day.  May also use over the counter stool softener 1-2 x/day.  AVOID laxatives.  --continue to control straining, gonzalo to help postop healing  --continue high fiber diet and stool softeners as needed    5)  Nocturia (nighttime urination): stop fluids 2 hours before bed. No water by bed If have leg swelling:  Elevate feet above chest x 1 hour before bed to get excess fluid off.  Can also use support hose (knee highs).      6)  Will talk with Dr. Brown about coordinating OR.  Consents signed.  Needs COVID testing preop.   Add labs for anesthesia appt (BMP, CBC, T&S).

## 2020-06-19 NOTE — PROGRESS NOTES
[facility] [id number]   [address] [address]    Christelle Reyes  [id number]  1975    Consulting Physician: Aurora Brown MD   GYN: MD Stephanie  Primary M.D.: Juani Mansfield DO    Chief Complaint   Patient presents with    Consult     HPI:     1)  UI:  (+) SYMONE < (+) UUI  X years.  (+) pads:2/day, usually minimum wetness but has irritation with min wetness. Daytime frequency: Q 30 min- 1 hours.  Nocturia: Yes: 2/night.   (--) dysuria,  (--) hematuria,  (--) frequent UTIs.  (--) complete bladder emptying. DV to help with min 2nd volume.   --2nd  was precipitous; had 3-4 months of issues with bladder (increased U/F, UUI--took ditropan temporarily)    2)  POP:  Present. To introitus.  Symptoms:(+)  Mild fullness.  +AUB. (--) vaginal discharge. (+) sexually active.  (--) dyspareunia.  (--)  Vaginal dryness.  (--) vaginal estrogen use.     3)  BM:  (+) constipation/straining. Takes stool softener daily with good control. Eats high fiber cereal PRN.   (--) chronic diarrhea. (--) hematochezia.  (--) fecal incontinence.  (--) fecal smearing/urgency.  (+) complete evacuation.     4)  Planning hyst 2020.  Would like an PF surgery at same time.   Cycles progressively heavier last few years.  For the last year has had breakthrough bleeding that lasts up to 2 weeks.  Occasionally has RLQ pain about residential through pill pack that usually correlates with breakthrough bleeding.  2020:  The uterus is normal in size and measures 7.6 x 5.5 x 5.5 cm.  The endometrial stripe is normal and measures 3mm.  There are multiple intramural and subserosal fibroids.  The largest fibroid is along the posterior body of the uterus and measures 2.7 x 1.5 x 2.2 cm.  Along the anterior body of the uterus is a 1.9 x 2 x 2.6 cm fibroid.  Along the left body of the uterus is a 1.7 x 1.9 x 2 cm fibroid and along the anterior fundus of the uterus a 2 x 1.7 x 1.4 fibroid.  The ovaries are normal in size and appearance. The right ovary measures 2.5 x 1.8 x 1.8 cm.   The left ovary measures 1.8 x 1.3 x 1.3cm.    There is appropriate flow in the ovaries.  Impression: No finding to correlate with the history of pain. Multiple uterine fibroids  3/2020 EMBx benign    Past Medical History  History reviewed. No pertinent past medical history.   Seasonal allergies    Past Surgical History  Past Surgical History:   Procedure Laterality Date    MOUTH SURGERY        Hysterectomy: No    Past Ob History     x 3.  C/s x 0.    Largest infant weight: 8#3oz.   yes FAVD. no episiotomy.      Gynecologic History  LMP: Patient's last menstrual period was 2020.  Age of menarche: 14 yo  Age of menopause: NA  Menstrual history: menorrhagia  Pap test: 2020 pap normal/HPV neg.  History of abnormal paps: No.  History of STIs:  No  Mammogram: Date of last: 2020.  Result: Normal  Colonoscopy: none  DEXA:  none    Family History  Family History   Problem Relation Age of Onset    Brain cancer Paternal Grandfather     Thyroid disease Maternal Grandfather     Hypertension Father     Diabetes Father     Melanoma Maternal Aunt     Skin cancer Maternal Uncle     Glaucoma Maternal Grandmother     Cataracts Maternal Grandmother     Hyperlipidemia Mother     Hyperthyroidism Sister     Amblyopia Neg Hx     Blindness Neg Hx     Cancer Neg Hx     Macular degeneration Neg Hx     Retinal detachment Neg Hx     Strabismus Neg Hx     Stroke Neg Hx     Breast cancer Neg Hx     Colon cancer Neg Hx     Ovarian cancer Neg Hx       Colon CA: No  Breast CA: No  GYN CA: Yes - PA unknown GYN CA   CA: No    Social History  Social History     Tobacco Use   Smoking Status Never Smoker   Smokeless Tobacco Never Used     Social History     Substance and Sexual Activity   Alcohol Use Yes    Alcohol/week: 0.0 standard drinks    Comment: rarely   .    Social History     Substance and Sexual Activity  "  Drug Use No     The patient is .  Resides in [place] [address].  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     Allergies  Review of patient's allergies indicates:   Allergen Reactions    Codeine      Other reaction(s): Hallucinations       Medications  Current Outpatient Medications on File Prior to Visit   Medication Sig Dispense Refill    docusate sodium (STOOL SOFTENER ORAL) Take by mouth.      ibuprofen (ADVIL,MOTRIN) 100 MG tablet Take 100 mg by mouth every 6 (six) hours as needed.      loratadine (CLARITIN) 10 mg tablet Take 10 mg by mouth once daily.      norethindrone-ethinyl estradiol-iron (MICROGESTIN FE1.5/30) 1.5 mg-30 mcg (21)/75 mg (7) tablet Take 1 tablet by mouth once daily. 84 tablet 2    FLUZONE QUAD 5394-1413, PF, 60 mcg (15 mcg x 4)/0.5 mL Syrg TO BE ADMINISTERED BY PHARMACIST FOR IMMUNIZATION       No current facility-administered medications on file prior to visit.        Review of Systems A 14 point ROS was reviewed with pertinent positives as noted above in the history of present illness.      Constitutional: negative  Eyes: negative  Endocrine: negative  Gastrointestinal: negative  Cardiovascular: negative  Respiratory: negative  Allergic/Immunologic: negative  Integumentary: negative  Psychiatric: negative  Musculoskeletal: negative   Ear/Nose/Throat: negative  Neurologic: negative  Genitourinary: SEE HPI  Hematologic/Lymphatic: negative   Breast: negative    Urogynecologic Exam  /80 (BP Location: Right arm, Patient Position: Sitting, BP Method: Medium (Automatic))   Ht 5' 7" (1.702 m)   Wt 60.7 kg (133 lb 13.1 oz)   LMP 06/09/2020   BMI 20.96 kg/m²     GENERAL APPEARANCE:  The patient is well-developed, well-nourished.  Neck:  Supple with no thyromegaly, no carotid bruits.  Heart:  Regular rate and rhythm, no murmurs, rubs or gallops.  Lungs:  Clear.  No CVA tenderness.  Abdomen:  Soft, nontender, nondistended, no " hepatosplenomegaly.  Incisions:  none    PELVIC:    External genitalia:  Normal Bartholins, Skenes and labia bilaterally.    Urethra:  No caruncle, diverticulum or masses.  (+) hypermobility.    Vagina:  Atrophy (--) , no bladder masses or tender, no discharge.    Cervix:  normal appearance  Uterus: 8 weeks, irregular with fibroids, mobile, NT  Adnexa: Not palpable.    POP-Q:  Aa 0; Ba 0; C -1; Ap -1; Bp -1; D -6.  Genital hiatus 4, perineal body 2, total vaginal length 10-11.    NEUROLOGIC:  Cranial nerves 2 through 12 intact.  Strength 5/5.  DTRs 2+ lower extremities.  S2 through 4 normal.  Sacral reflexes intact.    EXT: VILLALOBOS, 2+ pulses bilaterally, no C/C/E    COUGH STRESS TEST:  negative  KEGEL: 1 /5    RECTAL:    External:  Normal, (--) hemorrhoids, (--) dovetailing.   Internal:   deferred    PVR: 20 mL    Impression    1. Urinary incontinence, mixed    2. Cystocele, midline    3. Rectocele, female    4. Uterovaginal prolapse    5. Abnormal uterine bleeding    6. Chronic constipation    7. Nocturia more than twice per night        Initial Plan  The patient was counseled regarding these issues. The patient was given a summary sheet containing each of these issues with possible options for evaluation and management. When appropriate, we also reviewed computer-generated diagrams specific to their diagnoses..  All questions were addressed to the patient's satisfaction.    1)  Abnormal uterine bleeding:  --pelvic US 2/2020 fibroids  --EMBx 3/2020 benign  --planning hysterectomy 7/14/2020 with Dr. Brown    2)  Mixed urinary incontinence, urge > stress:    --urine C&S  --Empty bladder every 3 hours.  Empty well: wait a minute, lean forward on toilet.    --Avoid dietary irritants (see sheet).  Keep diary x 3-5 days to determine your irritants.  --KEGELS: do 10 in AM and 10 in PM, holding each x 10 seconds.  When you feel urge to go, STOP, KEGEL, and when urge has passed, then go to bathroom.  Consider PT in future.     --URGE: consider medication in future.  Takes 2-4 weeks to see if will have effect.  For dry mouth: get sour, sugar free lozenge or gum.    --STRESS:  Pessary vs. Sling. Urodynamics today: +SYMONE--add MUS to hysterectomy.     3)  Stage 2 cystocele/rectocele, stage 1 uterovaginal prolapse:  --discussed  --if surgery, would do uterosacral suspension + anterior/possible posterior repair    4)  History of constipation:  Controlling constipation may help bladder urgency/leakage and fiber may better control cholesterol and blood glucose.  Start daily fiber.  Take 1 tsp of fiber powder (psyllium or other sugar-free powder).  Mix in 8 oz of water.  Take x 3-5 days.  Then, increase fiber by 1 tsp every 3-5 days until stool is easy to pass.  Stop and continue at that dose.   Do not exceed 6 tsps/day.  May also use over the counter stool softener 1-2 x/day.  AVOID laxatives.  --continue to control straining, gonzalo to help postop healing  --continue high fiber diet and stool softeners as needed    5)  Nocturia (nighttime urination): stop fluids 2 hours before bed. No water by bed If have leg swelling:  Elevate feet above chest x 1 hour before bed to get excess fluid off.  Can also use support hose (knee highs).      6)  Will talk with Dr. Brown about coordinating OR.  Consents signed.  Add labs for anesthesia appt (BMP, CBC, T&S).    1. Patient consented for robotic/ laparoscopic uterosacral suspension, possible anterior/posterior repair with perineorrhaphy, possible laparotomy, placement of synthetic midurethral sling, and cystourethroscopy.   R/B/A reviewed. Specific risks reviewed include:  infection, bleeding, need for blood transfusion, damage to surrounding structures, anesthesia risks, death, heart attack, stroke, mesh erosion/extrusion, pain, dyspareunia, urinary retention, voiding dysfunction, urinary incontinence, exacerbation of urinary urge incontinence, and need for further surgeries.  We reviewed potential for  failure of POP defect repair and need for future surgery, with no way of predicting risk.  She understands success rate of uterosacral suspension approaches 75-90%.  Success rate of midurethral sling for SYMONE was reviewed as 80-85%, and she understands that this will not necessarily impact other types of urinary incontinence.  Alternatives reviewed include: pessary/PT for POP and pessary/periurethral injections/PT/medication for SYMONE.    2. Will add case to 7/14 VÍCTOR Brown case and follow hysterectomy.   3. T&S, urine HCG on DOS  4. Preoperative labs to be drawn at [facility] anesthesia preop: BMP, CBC, T&S  5. VTE Prophylaxis: per Dr. Brown.  6. Patient instructed on Magnesium citrate and chlorahexadine/dial soap prep to perform day before & AM of surgery.   7. Proceed to OR for above-mentioned procedure.    Approximately 60 min were spent in consult, 90 % in discussion.   Thank you for requesting consultation of your patient.  I look forward to participating in their care.    Pieter Castillo  Female Pelvic Medicine and Reconstructive Surgery  [facility] [place], [place]

## 2020-06-19 NOTE — LETTER
June 22, 2020      Aurora Brown MD  [address]  [facility] [address]           [facility] UroGynecology-[id number]   [address] [place] [address]  Phone: [phone]          Patient: Christelle Reyes   MR Number: [id number]   YOB: 1975   Date of Visit: 6/19/2020       Dear Dr. Aurora Brown:    Thank you for referring Christelle Reyes to me for evaluation. Attached you will find relevant portions of my assessment and plan of care.    If you have questions, please do not hesitate to call me. I look forward to following Christelle Reyes along with you.    Sincerely,    Pieter Castillo MD    Enclosure  CC:  No Recipients    If you would like to receive this communication electronically, please contact [email or web address] or [phone] to request more information on [facility] access.    For providers and/or their staff who would like to refer a patient to [facility], please contact us through our one-stop-shop provider referral line, [facility], at [phone].    If you feel you have received this communication in error or would no longer like to receive these types of communications, please e-mail [email or web address]

## 2020-06-20 LAB — BACTERIA UR CULT: NO GROWTH

## 2020-06-22 PROBLEM — R35.1 NOCTURIA MORE THAN TWICE PER NIGHT: Status: ACTIVE | Noted: 2020-06-22

## 2020-06-22 PROBLEM — N39.46 URINARY INCONTINENCE, MIXED: Status: ACTIVE | Noted: 2020-06-22

## 2020-06-22 PROBLEM — K59.09 CHRONIC CONSTIPATION: Status: ACTIVE | Noted: 2020-06-22

## 2020-06-22 PROBLEM — N93.9 ABNORMAL UTERINE BLEEDING: Status: ACTIVE | Noted: 2020-06-22

## 2020-06-22 PROBLEM — N81.6 RECTOCELE, FEMALE: Status: ACTIVE | Noted: 2020-06-22

## 2020-06-22 PROBLEM — N81.4 UTEROVAGINAL PROLAPSE: Status: ACTIVE | Noted: 2020-06-22

## 2020-06-22 PROBLEM — N81.11 CYSTOCELE, MIDLINE: Status: ACTIVE | Noted: 2020-06-22

## 2020-06-23 NOTE — PROCEDURES
TITLE OF OPERATION:  1. Complex cystometry.  2. Complex uroflowmetry.  3. Electromyography with surface electrodes.  4. Pressure voiding flow study.  5. Abdominal pressure measurement.  6. Leak point pressure measurement.    INDICATIONS:  1)  UI:  (+) SYMONE < (+) UUI  X years.  (+) pads:2/day, usually minimum wetness but has irritation with min wetness. Daytime frequency: Q 30 min- 1 hours.  Nocturia: Yes: 2/night.   (--) dysuria,  (--) hematuria,  (--) frequent UTIs.  (--) complete bladder emptying. DV to help with min 2nd volume.   --2nd  was precipitous; had 3-4 months of issues with bladder (increased U/F, UUI--took ditropan temporarily)     2)  POP:  Present. To introitus.  Symptoms:(+)  Mild fullness.  +AUB. (--) vaginal discharge. (+) sexually active.  (--) dyspareunia.  (--)  Vaginal dryness.  (--) vaginal estrogen use.   --POP-Q:  Aa 0; Ba 0; C -1; Ap -1; Bp -1; D -6.  Genital hiatus 4, perineal body 2, total vaginal length 10-11.    3)  BM:  (+) constipation/straining. Takes stool softener daily with good control. Eats high fiber cereal PRN.   (--) chronic diarrhea. (--) hematochezia.  (--) fecal incontinence.  (--) fecal smearing/urgency.  (+) complete evacuation.      4)  Planning hyst 2020.  Would like an PF surgery at same time.   Cycles progressively heavier last few years.  For the last year has had breakthrough bleeding that lasts up to 2 weeks.  Occasionally has RLQ pain about custodial through pill pack that usually correlates with breakthrough bleeding.  2020:  The uterus is normal in size and measures 7.6 x 5.5 x 5.5 cm.  The endometrial stripe is normal and measures 3mm.  There are multiple intramural and subserosal fibroids.  The largest fibroid is along the posterior body of the uterus and measures 2.7 x 1.5 x 2.2 cm.  Along the anterior body of the uterus is a 1.9 x 2 x 2.6 cm fibroid.  Along the left body of the uterus is a 1.7 x 1.9 x 2 cm fibroid and along the anterior fundus of  the uterus a 2 x 1.7 x 1.4 fibroid. The ovaries are normal in size and appearance. The right ovary measures 2.5 x 1.8 x 1.8 cm.   The left ovary measures 1.8 x 1.3 x 1.3cm.    There is appropriate flow in the ovaries.  Impression: No finding to correlate with the history of pain. Multiple uterine fibroids  3/2020 EMBx benign    PREOPERATIVE DIAGNOSIS:  1. Urinary incontinence, mixed    2. Cystocele, midline    3. Rectocele, female    4. Uterovaginal prolapse    5. Abnormal uterine bleeding    6. Chronic constipation    7. Nocturia more than twice per night      POSTOPERATIVE DIAGNOSIS:  1. Urinary incontinence, mixed    2. Cystocele, midline    3. Rectocele, female    4. Uterovaginal prolapse    5. Abnormal uterine bleeding    6. Chronic constipation    7. Nocturia more than twice per night      ANESTHESIA:  None.    SPECIMEN (BACTERIOLOGICAL, PATHOLOGICAL OR OTHER):  None.    PROSTHETIC DEVICE/IMPLANT:  None.    SURGEONS NARRATIVE:  A time out was performed in which the patient identity and procedure were confirmed.  Urodynamic evaluation was performed using a computerized system ([employer], [place]  Uroflowmetry was performed on the patient in the sitting position without catheters in place.  Subsequent urodynamic testing was performed with the patient in the lithotomy position at 45 degrees. Air charged catheters were used with sterile water as the infusion medium. Vesical and abdominal (rectal) pressures were measured, and detrusor pressure was calculated. EMG activity was recorded with surface electrodes. During filling, room temperature sterile water was infused at a rate of 30 cubic centimeters per minute. The patient was asked cough after instillation of each 100cc volume. Two Valsalva leak point pressures and two cough leak point pressures were performed with the catheters in place at 300 cubic centimeters and again at maximum capacity. Valsalva leak point pressure was defined as the difference  between vesical pressure at which leakage was noted (visualized at the external urethral meatus) and the baseline vesical pressure. Following urodynamic testing, a pressure flow study was performed with the patient in the sitting position. Vesical and abdominal pressures were monitored and detrusor pressures were calculated. After the pressure flow study, the catheters were then removed. The patient tolerated the procedure well.     Urine dipstick: neg.    1.  VOIDING PHASE:      a.  Uroflowmetry:   Prolapse reduction: No   Voided volume:  78 mL    PVR:   10 mL    The overall configuration of this uroflow study was with insufficient volume for interpretation.      b.  Pressure flow:   Prolapse reduction: No   Voided volume:   449 mL   Voiding time:   102 seconds   Peak flow:  13 mL/s    Avg flow:  5 mL/s   Max det pressure:  40  cm H20   Det pressure at max flow: 18 cm H20   Void initiated by detrusor contraction.     Urethral relaxation (EMG):  absent.     PVR (calculated):  0 mL    The overall configuration of this pressure flow study was prolonged but normal.      2.  FILLING PHASE:   1st desire: 0 mL   Normal desire:  70 mL   Strong desire:  118 mL   Urgency:  418 mL   Compliance (calculated)  Approx 150 mL/cm H20   EMG activity during filling: with gradual increase   Detrusor contractions observed: No.      3.  URETHRAL FUNCTION/STORAGE PHASE:    a.  WITH prolapse reduction:   CLPP (150 mL): Negative  at  77 cm H20   VLPP (150 mL): Negative  at  54 cm H20    CLPP (300 mL): Negative  at  70 cm H20   VLPP (300 mL): Negative  at  45 cm H20    CLPP (MAX ):    Negative  at  71 cm H20   VLPP (MAX):     Negative  at  53 cm H20   POS CLPP at max cap once catheters removed.     These findings are consistent with Positive urodynamic stress incontinence at max cap once pves cathter was removed.    Assessment:  UF with insufficient volume for interpretation.  PF prolonged but normal.  Compliance  normal.  Max capacity 449 mL.  DO (--).  SYMONE (+).      Plan:  1)  Abnormal uterine bleeding:  --pelvic US 2/2020 fibroids  --EMBx 3/2020 benign  --planning hysterectomy 7/14/2020 with Dr. Brown     2)  Mixed urinary incontinence, urge > stress:    --urine C&S  --Empty bladder every 3 hours.  Empty well: wait a minute, lean forward on toilet.    --Avoid dietary irritants (see sheet).  Keep diary x 3-5 days to determine your irritants.  --KEGELS: do 10 in AM and 10 in PM, holding each x 10 seconds.  When you feel urge to go, STOP, KEGEL, and when urge has passed, then go to bathroom.  Consider PT in future.    --URGE: consider medication in future.  Takes 2-4 weeks to see if will have effect.  For dry mouth: get sour, sugar free lozenge or gum.    --STRESS:  Pessary vs. Sling. Urodynamics today: +SYMONE--add MUS to hysterectomy.      3)  Stage 2 cystocele/rectocele, stage 1 uterovaginal prolapse:  --discussed  --if surgery, would do uterosacral suspension + anterior/possible posterior repair     4)  History of constipation:  Controlling constipation may help bladder urgency/leakage and fiber may better control cholesterol and blood glucose.  Start daily fiber.  Take 1 tsp of fiber powder (psyllium or other sugar-free powder).  Mix in 8 oz of water.  Take x 3-5 days.  Then, increase fiber by 1 tsp every 3-5 days until stool is easy to pass.  Stop and continue at that dose.   Do not exceed 6 tsps/day.  May also use over the counter stool softener 1-2 x/day.  AVOID laxatives.  --continue to control straining, gonzalo to help postop healing  --continue high fiber diet and stool softeners as needed     5)  Nocturia (nighttime urination): stop fluids 2 hours before bed. No water by bed If have leg swelling:  Elevate feet above chest x 1 hour before bed to get excess fluid off.  Can also use support hose (knee highs).       6)  Will talk with Dr. Brown about coordinating OR.  Consents signed.  Add labs for anesthesia appt (BMP,  CBC, T&S).    1. Patient consented for robotic/ laparoscopic uterosacral suspension, possible anterior/posterior repair with perineorrhaphy, possible laparotomy, placement of synthetic midurethral sling, and cystourethroscopy.   R/B/A reviewed. Specific risks reviewed include:  infection, bleeding, need for blood transfusion, damage to surrounding structures, anesthesia risks, death, heart attack, stroke, mesh erosion/extrusion, pain, dyspareunia, urinary retention, voiding dysfunction, urinary incontinence, exacerbation of urinary urge incontinence, and need for further surgeries.  We reviewed potential for failure of POP defect repair and need for future surgery, with no way of predicting risk.  She understands success rate of uterosacral suspension approaches 75-90%.  Success rate of midurethral sling for SYMONE was reviewed as 80-85%, and she understands that this will not necessarily impact other types of urinary incontinence.  Alternatives reviewed include: pessary/PT for POP and pessary/periurethral injections/PT/medication for SYMONE.    2. Will add case to 7/14 VÍCTOR Brown case and follow hysterectomy.   3. T&S, urine HCG on DOS  4. Preoperative labs to be drawn at [facility] preop: BMP, CBC, T&S  5. VTE Prophylaxis: per Dr. Brown.  6. Patient instructed on Magnesium citrate and chlorahexadine/dial soap prep to perform day before & AM of surgery.   7. Proceed to OR for above-mentioned procedure.  --------------------------------------------------    Title of Operation:   Cystourethroscopy.     INDICATIONS:  As above    PREOPERATIVE DIAGNOSIS  As above    POSTOPERATIVE DIAGNOSIS:   As above    Anesthesia:   2% Xylocaine gel.    Specimen (Bacteriological, Pathological or other):   None.     Prosthetic Device/Implant:   None.     Surgeons Narrative:     After informed consent was obtained, the patient was placed in the lithotomy position. The urethral meatus was prepped with Betadine and 10 cubic centimeters of 2%  Xylocaine gel were introduced into the urethra. A flexible cystourethroscope was introduced into the bladder. The bladder was distended with approximately 300 cubic centimeters of sterile water. A systematic survey was performed in which the bladder was surveyed using multiple sequential passes in a clockwise fashion from the bladder dome to the bladder base to the urethrovesical junction. The trigone and ureteral orifices were observed. The scope was then flipped back on itself, and the urethrovesical junction was viewed. A vaginal examining finger was then placed with pressure suburethrally at the urethrovesical junction as the telescope was withdrawn in order to perform positive pressure urethroscopy.  Standard maneuvers of cough, squeeze and Valsalva were performed. The telescope was then completely withdrawn.     Findings: Urethroscopy:  Normal.  Cystoscopy:  Normal bladder mucosa, bilateral ureteral flow was noted.  Assessment: Essentially normal cystourethroscopy.     Plan: The patient will follow up with Dr. Castillo as scheduled.  See urodynamics note from 6/19/2020 for further plan details.

## 2020-07-01 ENCOUNTER — ANESTHESIA EVENT (OUTPATIENT)
Dept: SURGERY | Facility: OTHER | Age: 45
End: 2020-07-01
Payer: COMMERCIAL

## 2020-07-01 ENCOUNTER — OFFICE VISIT (OUTPATIENT)
Dept: OBSTETRICS AND GYNECOLOGY | Facility: CLINIC | Age: 45
End: 2020-07-01
Attending: OBSTETRICS & GYNECOLOGY
Payer: COMMERCIAL

## 2020-07-01 ENCOUNTER — HOSPITAL ENCOUNTER (OUTPATIENT)
Dept: PREADMISSION TESTING | Facility: OTHER | Age: 45
Discharge: HOME | End: 2020-07-01
Attending: OBSTETRICS & GYNECOLOGY
Payer: COMMERCIAL

## 2020-07-01 VITALS
HEIGHT: 62 IN | SYSTOLIC BLOOD PRESSURE: 129 MMHG | TEMPERATURE: 98 F | OXYGEN SATURATION: 100 % | WEIGHT: 132 LBS | HEART RATE: 83 BPM | DIASTOLIC BLOOD PRESSURE: 88 MMHG | BODY MASS INDEX: 24.29 KG/M2

## 2020-07-01 VITALS — DIASTOLIC BLOOD PRESSURE: 72 MMHG | WEIGHT: 132.5 LBS | BODY MASS INDEX: 24.23 KG/M2 | SYSTOLIC BLOOD PRESSURE: 118 MMHG

## 2020-07-01 DIAGNOSIS — N93.9 ABNORMAL UTERINE BLEEDING: ICD-10-CM

## 2020-07-01 DIAGNOSIS — N92.1 MENOMETRORRHAGIA: Primary | ICD-10-CM

## 2020-07-01 DIAGNOSIS — N81.6 RECTOCELE, FEMALE: ICD-10-CM

## 2020-07-01 DIAGNOSIS — N81.4 UTEROVAGINAL PROLAPSE: ICD-10-CM

## 2020-07-01 DIAGNOSIS — N81.11 CYSTOCELE, MIDLINE: ICD-10-CM

## 2020-07-01 DIAGNOSIS — D25.1 FIBROIDS, INTRAMURAL: ICD-10-CM

## 2020-07-01 DIAGNOSIS — N39.46 URINARY INCONTINENCE, MIXED: ICD-10-CM

## 2020-07-01 DIAGNOSIS — R10.2 PELVIC PAIN: ICD-10-CM

## 2020-07-01 DIAGNOSIS — D25.2 FIBROIDS, SUBSEROUS: ICD-10-CM

## 2020-07-01 DIAGNOSIS — Z01.818 PREOP TESTING: ICD-10-CM

## 2020-07-01 LAB
ABO + RH BLD: NORMAL
ANION GAP SERPL CALC-SCNC: 9 MMOL/L (ref 8–16)
BASOPHILS # BLD AUTO: 0.04 K/UL (ref 0–0.2)
BASOPHILS NFR BLD: 0.5 % (ref 0–1.9)
BLD GP AB SCN CELLS X3 SERPL QL: NORMAL
BUN SERPL-MCNC: 14 MG/DL (ref 6–20)
CALCIUM SERPL-MCNC: 8.9 MG/DL (ref 8.7–10.5)
CHLORIDE SERPL-SCNC: 108 MMOL/L (ref 95–110)
CO2 SERPL-SCNC: 22 MMOL/L (ref 23–29)
CREAT SERPL-MCNC: 0.7 MG/DL (ref 0.5–1.4)
DIFFERENTIAL METHOD: NORMAL
EOSINOPHIL # BLD AUTO: 0.2 K/UL (ref 0–0.5)
EOSINOPHIL NFR BLD: 2.3 % (ref 0–8)
ERYTHROCYTE [DISTWIDTH] IN BLOOD BY AUTOMATED COUNT: 12.7 % (ref 11.5–14.5)
EST. GFR  (AFRICAN AMERICAN): >60 ML/MIN/1.73 M^2
EST. GFR  (NON AFRICAN AMERICAN): >60 ML/MIN/1.73 M^2
GLUCOSE SERPL-MCNC: 82 MG/DL (ref 70–110)
HCT VFR BLD AUTO: 41.9 % (ref 37–48.5)
HGB BLD-MCNC: 13.8 G/DL (ref 12–16)
IMM GRANULOCYTES # BLD AUTO: 0.02 K/UL (ref 0–0.04)
IMM GRANULOCYTES NFR BLD AUTO: 0.3 % (ref 0–0.5)
LYMPHOCYTES # BLD AUTO: 2.6 K/UL (ref 1–4.8)
LYMPHOCYTES NFR BLD: 35.9 % (ref 18–48)
MCH RBC QN AUTO: 29 PG (ref 27–31)
MCHC RBC AUTO-ENTMCNC: 32.9 G/DL (ref 32–36)
MCV RBC AUTO: 88 FL (ref 82–98)
MONOCYTES # BLD AUTO: 0.4 K/UL (ref 0.3–1)
MONOCYTES NFR BLD: 5.3 % (ref 4–15)
NEUTROPHILS # BLD AUTO: 4.1 K/UL (ref 1.8–7.7)
NEUTROPHILS NFR BLD: 55.7 % (ref 38–73)
NRBC BLD-RTO: 0 /100 WBC
PLATELET # BLD AUTO: 246 K/UL (ref 150–350)
PMV BLD AUTO: 10.9 FL (ref 9.2–12.9)
POTASSIUM SERPL-SCNC: 3.8 MMOL/L (ref 3.5–5.1)
RBC # BLD AUTO: 4.76 M/UL (ref 4–5.4)
SODIUM SERPL-SCNC: 139 MMOL/L (ref 136–145)
WBC # BLD AUTO: 7.35 K/UL (ref 3.9–12.7)

## 2020-07-01 PROCEDURE — 80048 BASIC METABOLIC PNL TOTAL CA: CPT

## 2020-07-01 PROCEDURE — 85025 COMPLETE CBC W/AUTO DIFF WBC: CPT

## 2020-07-01 PROCEDURE — 99499 UNLISTED E&M SERVICE: CPT | Mod: S$GLB,,, | Performed by: OBSTETRICS & GYNECOLOGY

## 2020-07-01 PROCEDURE — 99999 PR PBB SHADOW E&M-EST. PATIENT-LVL III: CPT | Mod: PBBFAC,,, | Performed by: OBSTETRICS & GYNECOLOGY

## 2020-07-01 PROCEDURE — 99999 PR PBB SHADOW E&M-EST. PATIENT-LVL III: ICD-10-PCS | Mod: PBBFAC,,, | Performed by: OBSTETRICS & GYNECOLOGY

## 2020-07-01 PROCEDURE — 36415 COLL VENOUS BLD VENIPUNCTURE: CPT

## 2020-07-01 PROCEDURE — 86901 BLOOD TYPING SEROLOGIC RH(D): CPT

## 2020-07-01 PROCEDURE — 99499 NO LOS: ICD-10-PCS | Mod: S$GLB,,, | Performed by: OBSTETRICS & GYNECOLOGY

## 2020-07-01 RX ORDER — MUPIROCIN 20 MG/G
OINTMENT TOPICAL
Status: CANCELLED | OUTPATIENT
Start: 2020-07-01

## 2020-07-01 RX ORDER — FAMOTIDINE 20 MG/1
20 TABLET, FILM COATED ORAL
Status: CANCELLED | OUTPATIENT
Start: 2020-07-01 | End: 2020-07-01

## 2020-07-01 RX ORDER — PREGABALIN 50 MG/1
50 CAPSULE ORAL
Status: CANCELLED | OUTPATIENT
Start: 2020-07-01 | End: 2020-07-01

## 2020-07-01 RX ORDER — SODIUM CHLORIDE, SODIUM LACTATE, POTASSIUM CHLORIDE, CALCIUM CHLORIDE 600; 310; 30; 20 MG/100ML; MG/100ML; MG/100ML; MG/100ML
INJECTION, SOLUTION INTRAVENOUS CONTINUOUS
Status: CANCELLED | OUTPATIENT
Start: 2020-07-01

## 2020-07-01 RX ORDER — SODIUM CHLORIDE 9 MG/ML
INJECTION, SOLUTION INTRAVENOUS CONTINUOUS
Status: CANCELLED | OUTPATIENT
Start: 2020-07-01

## 2020-07-01 RX ORDER — ACETAMINOPHEN 500 MG
1000 TABLET ORAL
Status: CANCELLED | OUTPATIENT
Start: 2020-07-01 | End: 2020-07-01

## 2020-07-01 RX ORDER — CELECOXIB 200 MG/1
400 CAPSULE ORAL
Status: CANCELLED | OUTPATIENT
Start: 2020-07-01 | End: 2020-07-01

## 2020-07-01 RX ORDER — LIDOCAINE HYDROCHLORIDE 10 MG/ML
0.5 INJECTION, SOLUTION EPIDURAL; INFILTRATION; INTRACAUDAL; PERINEURAL ONCE
Status: CANCELLED | OUTPATIENT
Start: 2020-07-01 | End: 2020-07-01

## 2020-07-01 NOTE — PROGRESS NOTES
HISTORY OF PRESENT ILLNESS:    Christelle Reyes is a 44 y.o. female, , Patient's last menstrual period was 2020.,  presents for pre-op for RALH/BS.    Cycles progressively heavier last few years.  For the last year has had breakthrough bleeding that lasts up to 2 weeks.  Occasionally has RLQ pain about long term through pill pack that usually correlates with breakthrough bleeding.    U/S:  The uterus is normal in size and measures 7.6 x 5.5 x 5.5 cm.  The endometrial stripe is normal and measures 3mm.  There are multiple intramural and subserosal fibroids.  The largest fibroid is along the posterior body of the uterus and measures 2.7 x 1.5 x 2.2 cm.  Along the anterior body of the uterus is a 1.9 x 2 x 2.6 cm fibroid.  Along the left body of the uterus is a 1.7 x 1.9 x 2 cm fibroid and along the anterior fundus of the uterus a 2 x 1.7 x 1.4 fibroid.  The ovaries are normal in size and appearance.     She has 3 children and does not desire future fertility.    Concurrent surgery planned with urogyn.    History reviewed. No pertinent past medical history.    Past Surgical History:   Procedure Laterality Date    MOUTH SURGERY         MEDICATIONS AND ALLERGIES:      Current Outpatient Medications:     docusate sodium (STOOL SOFTENER ORAL), Take by mouth., Disp: , Rfl:     FLUZONE QUAD 0370-2251, PF, 60 mcg (15 mcg x 4)/0.5 mL Syrg, TO BE ADMINISTERED BY PHARMACIST FOR IMMUNIZATION, Disp: , Rfl:     loratadine (CLARITIN) 10 mg tablet, Take 10 mg by mouth once daily., Disp: , Rfl:     norethindrone-ethinyl estradiol-iron (MICROGESTIN FE1.5/30) 1.5 mg-30 mcg (21)/75 mg (7) tablet, Take 1 tablet by mouth once daily., Disp: 84 tablet, Rfl: 2    ibuprofen (ADVIL,MOTRIN) 100 MG tablet, Take 100 mg by mouth every 6 (six) hours as needed., Disp: , Rfl:     Review of patient's allergies indicates:   Allergen Reactions    Codeine      Other reaction(s): Hallucinations       Family History   Problem Relation  Age of Onset    Brain cancer Paternal Grandfather     Thyroid disease Maternal Grandfather     Hypertension Father     Diabetes Father     Melanoma Maternal Aunt     Skin cancer Maternal Uncle     Glaucoma Maternal Grandmother     Cataracts Maternal Grandmother     Hyperlipidemia Mother     Hyperthyroidism Sister     Amblyopia Neg Hx     Blindness Neg Hx     Cancer Neg Hx     Macular degeneration Neg Hx     Retinal detachment Neg Hx     Strabismus Neg Hx     Stroke Neg Hx     Breast cancer Neg Hx     Colon cancer Neg Hx     Ovarian cancer Neg Hx        Social History     Socioeconomic History    Marital status:      Spouse name: Not on file    Number of children: Not on file    Years of education: Not on file    Highest education level: Not on file   Occupational History    Not on file   Social Needs    Financial resource strain: Not on file    Food insecurity     Worry: Not on file     Inability: Not on file    Transportation needs     Medical: Not on file     Non-medical: Not on file   Tobacco Use    Smoking status: Never Smoker    Smokeless tobacco: Never Used   Substance and Sexual Activity    Alcohol use: Yes     Alcohol/week: 0.0 standard drinks     Comment: rarely    Drug use: No    Sexual activity: Yes     Partners: Male     Birth control/protection: OCP   Lifestyle    Physical activity     Days per week: Not on file     Minutes per session: Not on file    Stress: Not on file   Relationships    Social connections     Talks on phone: Not on file     Gets together: Not on file     Attends Alevism service: Not on file     Active member of club or organization: Not on file     Attends meetings of clubs or organizations: Not on file     Relationship status: Not on file   Other Topics Concern    Not on file   Social History Narrative    Not on file       ROS:  GENERAL: No weight changes. No swelling. No fatigue. No fever.  CARDIOVASCULAR: No chest pain. No shortness  of breath. No leg cramps.   NEUROLOGICAL: No headaches. No vision changes.  BREASTS: No pain. No lumps. No discharge.  ABDOMEN: No pain. No nausea. No vomiting. No diarrhea. No constipation.  REPRODUCTIVE: No abnormal bleeding.   VULVA: No pain. No lesions. No itching.  VAGINA:  No itching. No odor. No discharge. No lesions.  URINARY:  No dysuria.    /72   Wt 60.1 kg (132 lb 7.9 oz)   LMP 06/09/2020   BMI 24.23 kg/m²     PE:  APPEARANCE: Well nourished, well developed, in no acute distress.  ABDOMEN: Soft. No tenderness or masses. No hepatosplenomegaly. No hernias.  BREASTS, FUNDOSCOPIC, RECTAL DEFERRED  PELVIC: External female genitalia without lesions.  Female hair distribution. Adequate perineal body, Normal urethral meatus. Vagina moist and well rugated without lesions or discharge.  Cervix pink without lesions, discharge or tenderness. Uterus is normal size, regular, mobile and nontender. Adnexa without masses or tenderness.  EXTREMITIES: No edema      DIAGNOSIS & PLAN  1. Menometrorrhagia  Full code    Place in Outpatient    Vital signs    Insert peripheral IV    Remy to [place]    Notify Physician/Vital Signs Parameters Urine output less than 0.5mL/kg/hr (with indwelling catheter) or 30 mL/hr (without indwelling catheter) or blood glucose greater than 200 mg/dL    Notify physician    Notify Physician - Potential Need of Opioid Reversal    Diet NPO    Chlorohexidine Gluconate Bath    Pregnancy, urine rapid    Place JAVIER hose    Place sequential compression device    CBC auto differential    Basic metabolic panel    Type & Screen Pre Op   2. Pelvic pain  Full code    Place in Outpatient    Vital signs    Insert peripheral IV    Remy to [place]    Notify Physician/Vital Signs Parameters Urine output less than 0.5mL/kg/hr (with indwelling catheter) or 30 mL/hr (without indwelling catheter) or blood glucose greater than 200 mg/dL    Notify physician    Notify Physician - Potential Need of Opioid Reversal     Diet NPO    Chlorohexidine Gluconate Bath    Pregnancy, urine rapid    Place JAVIER hose    Place sequential compression device    CBC auto differential    Basic metabolic panel    Type & Screen Pre Op   3. Fibroids, intramural  Full code    Place in Outpatient    Vital signs    Insert peripheral IV    Remy to [place]    Notify Physician/Vital Signs Parameters Urine output less than 0.5mL/kg/hr (with indwelling catheter) or 30 mL/hr (without indwelling catheter) or blood glucose greater than 200 mg/dL    Notify physician    Notify Physician - Potential Need of Opioid Reversal    Diet NPO    Chlorohexidine Gluconate Bath    Pregnancy, urine rapid    Place JAVIER hose    Place sequential compression device    CBC auto differential    Basic metabolic panel    Type & Screen Pre Op   4. Fibroids, subserous  Full code    Place in Outpatient    Vital signs    Insert peripheral IV    Remy to [place]    Notify Physician/Vital Signs Parameters Urine output less than 0.5mL/kg/hr (with indwelling catheter) or 30 mL/hr (without indwelling catheter) or blood glucose greater than 200 mg/dL    Notify physician    Notify Physician - Potential Need of Opioid Reversal    Diet NPO    Chlorohexidine Gluconate Bath    Pregnancy, urine rapid    Place JAVIER hose    Place sequential compression device    CBC auto differential    Basic metabolic panel    Type & Screen Pre Op           COUNSELING:  Patient counseled for RALH/bilateral salpingectomy. Counseled on risks/benefits of BSO.  Desires ovarian conservation unless abnormal at the time of surgery.  Counseled on operative risks of laparotomy, infection, bleeding, transfusion, organ injury and anesthesia complications.  She desires to proceed.

## 2020-07-01 NOTE — DISCHARGE INSTRUCTIONS
Information to Prepare you for your Surgery    PRE-ADMIT TESTING -  [phone]    [address]  [place] [facility]          Your surgery has been scheduled at [facility]. We are pleased to have the opportunity to serve you. For Further Information please call [phone].    On the day of surgery please report to the Information Desk on the 1st floor.    · CONTACT YOUR PHYSICIAN'S OFFICE THE DAY PRIOR TO YOUR SURGERY TO OBTAIN YOUR ARRIVAL TIME.     · The evening before surgery do not eat anything after 9 p.m. ( this includes hard candy, chewing gum and mints).  You may only have GATORADE, POWERADE AND WATER  from 9 p.m. until you leave your home.   DO NOT DRINK ANY LIQUIDS ON THE WAY TO THE HOSPITAL.      SPECIAL MEDICATION INSTRUCTIONS: TAKE medications checked off by the Anesthesiologist on your Medication List.    Angiogram Patients: Take medications as instructed by your physician, including aspirin.     Surgery Patients:    If you take ASPIRIN - Your PHYSICIAN/SURGEON will need to inform you IF/OR when you need to stop taking aspirin prior to your surgery.     Do Not take any medications containing IBUPROFEN.  Do Not Wear any make-up or dark nail polish   (especially eye make-up) to surgery. If you come to surgery with makeup on you will be required to remove the makeup or nail polish.    Do not shave your surgical area at least 5 days prior to your surgery. The surgical prep will be performed at the hospital according to Infection Control regulations.    Leave all valuables at home.   Do Not wear any jewelry or watches, including any metal in body piercings. Jewelry must be removed prior to coming to the hospital.  There is a possibility that rings that are unable to be removed may be cut off if they are on the surgical extremity.    Contact Lens must be removed before surgery. Either do not wear the contact lens or bring a case and solution for  storage.  Please bring a container for eyeglasses or dentures as required.  Bring any paperwork your physician has provided, such as consent forms,  history and physicals, doctor's orders, etc.   Bring comfortable clothes that are loose fitting to wear upon discharge. Take into consideration the type of surgery being performed.  Maintain your diet as advised per your physician the day prior to surgery.      Adequate rest the night before surgery is advised.   Park in the Parking lot behind the hospital or in the [facility] across the street from the parking lot. Parking is complimentary.  If you will be discharged the same day as your procedure, please arrange for a responsible adult to drive you home or to accompany you if traveling by taxi.   YOU WILL NOT BE PERMITTED TO DRIVE OR TO LEAVE THE HOSPITAL ALONE AFTER SURGERY.   If you are being discharged the same day, it is strongly recommended that you arrange for someone to remain with you for the first 24 hrs following your surgery.    The Surgeon will speak to your family/visitor after your surgery regarding the outcome of your surgery and post op care.  The Surgeon may speak to you after your surgery, but there is a possibility you may not remember the details.  Please check with your family members regarding the conversation with the Surgeon.    We strongly recommend whoever is bringing you home be present for discharge instructions.  This will ensure a thorough understanding for your post op home care.    ALL CHILDREN MUST ALWAYS BE ACCOMPANIED BY AN ADULT.    Visitors-Refer to current Visitor policy handouts.    Thank you for your cooperation.  The Staff of [facility].                Bathing Instructions with Hibiclens     Shower the evening before and morning of your procedure with Hibiclens:   Wash your face with water and your regular face wash/soap   Apply Hibiclens directly on your skin or on a wet washcloth and wash  gently. When showering: Move away from the shower stream when applying Hibiclens to avoid rinsing off too soon.   Rinse thoroughly with warm water   Do not dilute Hibiclens         Dry off as usual, do not use any deodorant, powder, body lotions, perfume, after shave or cologne.

## 2020-07-01 NOTE — ANESTHESIA PREPROCEDURE EVALUATION
07/01/2020  Christelle Reyes is a 44 y.o., female.    Anesthesia Evaluation    I have reviewed the Patient Summary Reports.    I have reviewed the Nursing Notes.    I have reviewed the Medications.     Review of Systems  Anesthesia Hx:  No problems with previous Anesthesia    Social:  Non-Smoker, No Alcohol Use    Hematology/Oncology:  Hematology Normal   Oncology Normal     EENT/Dental:EENT/Dental Normal   Cardiovascular:  Cardiovascular Normal Exercise tolerance: good     Pulmonary:  Pulmonary Normal    Renal/:  Renal/ Normal     Hepatic/GI:  Hepatic/GI Normal    Musculoskeletal:  Spine Disorders: cervical and lumbar    Neurological:  Neurology Normal    Endocrine:  Endocrine Normal    Dermatological:  Skin Normal    Psych:  Psychiatric Normal           Physical Exam  General:  Well nourished    Airway/Jaw/Neck:  Airway Findings: Mouth Opening: Normal Tongue: Normal  General Airway Assessment: Adult, Good  Mallampati: I  TM Distance: Normal, at least 6 cm  Jaw/Neck Findings:  Neck ROM: Normal ROM      Dental:  Dental Findings: In tact        Mental Status:  Mental Status Findings:  Cooperative, Alert and Oriented         Anesthesia Plan  Type of Anesthesia, risks & benefits discussed:  Anesthesia Type:  general  Patient's Preference:   Intra-op Monitoring Plan: standard ASA monitors  Intra-op Monitoring Plan Comments:   Post Op Pain Control Plan: per primary service following discharge from PACU  Post Op Pain Control Plan Comments:   Induction:    Beta Blocker:         Informed Consent: Patient understands risks and agrees with Anesthesia plan.  Questions answered. Anesthesia consent signed with patient.  ASA Score: 1     Day of Surgery Review of History & Physical:    H&P update referred to the surgeon.     Anesthesia Plan Notes: CBC and T&S today.        Ready For Surgery From Anesthesia  Perspective.

## 2020-07-01 NOTE — H&P (VIEW-ONLY)
HISTORY OF PRESENT ILLNESS:    Christelle Reyes is a 44 y.o. female, , Patient's last menstrual period was 2020.,  presents for pre-op for RALH/BS.    Cycles progressively heavier last few years.  For the last year has had breakthrough bleeding that lasts up to 2 weeks.  Occasionally has RLQ pain about USP through pill pack that usually correlates with breakthrough bleeding.    U/S:  The uterus is normal in size and measures 7.6 x 5.5 x 5.5 cm.  The endometrial stripe is normal and measures 3mm.  There are multiple intramural and subserosal fibroids.  The largest fibroid is along the posterior body of the uterus and measures 2.7 x 1.5 x 2.2 cm.  Along the anterior body of the uterus is a 1.9 x 2 x 2.6 cm fibroid.  Along the left body of the uterus is a 1.7 x 1.9 x 2 cm fibroid and along the anterior fundus of the uterus a 2 x 1.7 x 1.4 fibroid.  The ovaries are normal in size and appearance.     She has 3 children and does not desire future fertility.    Concurrent surgery planned with urogyn.    History reviewed. No pertinent past medical history.    Past Surgical History:   Procedure Laterality Date    MOUTH SURGERY         MEDICATIONS AND ALLERGIES:      Current Outpatient Medications:     docusate sodium (STOOL SOFTENER ORAL), Take by mouth., Disp: , Rfl:     FLUZONE QUAD 8907-1813, PF, 60 mcg (15 mcg x 4)/0.5 mL Syrg, TO BE ADMINISTERED BY PHARMACIST FOR IMMUNIZATION, Disp: , Rfl:     loratadine (CLARITIN) 10 mg tablet, Take 10 mg by mouth once daily., Disp: , Rfl:     norethindrone-ethinyl estradiol-iron (MICROGESTIN FE1.5/30) 1.5 mg-30 mcg (21)/75 mg (7) tablet, Take 1 tablet by mouth once daily., Disp: 84 tablet, Rfl: 2    ibuprofen (ADVIL,MOTRIN) 100 MG tablet, Take 100 mg by mouth every 6 (six) hours as needed., Disp: , Rfl:     Review of patient's allergies indicates:   Allergen Reactions    Codeine      Other reaction(s): Hallucinations       Family History   Problem Relation  Age of Onset    Brain cancer Paternal Grandfather     Thyroid disease Maternal Grandfather     Hypertension Father     Diabetes Father     Melanoma Maternal Aunt     Skin cancer Maternal Uncle     Glaucoma Maternal Grandmother     Cataracts Maternal Grandmother     Hyperlipidemia Mother     Hyperthyroidism Sister     Amblyopia Neg Hx     Blindness Neg Hx     Cancer Neg Hx     Macular degeneration Neg Hx     Retinal detachment Neg Hx     Strabismus Neg Hx     Stroke Neg Hx     Breast cancer Neg Hx     Colon cancer Neg Hx     Ovarian cancer Neg Hx        Social History     Socioeconomic History    Marital status:      Spouse name: Not on file    Number of children: Not on file    Years of education: Not on file    Highest education level: Not on file   Occupational History    Not on file   Social Needs    Financial resource strain: Not on file    Food insecurity     Worry: Not on file     Inability: Not on file    Transportation needs     Medical: Not on file     Non-medical: Not on file   Tobacco Use    Smoking status: Never Smoker    Smokeless tobacco: Never Used   Substance and Sexual Activity    Alcohol use: Yes     Alcohol/week: 0.0 standard drinks     Comment: rarely    Drug use: No    Sexual activity: Yes     Partners: Male     Birth control/protection: OCP   Lifestyle    Physical activity     Days per week: Not on file     Minutes per session: Not on file    Stress: Not on file   Relationships    Social connections     Talks on phone: Not on file     Gets together: Not on file     Attends Yarsanism service: Not on file     Active member of club or organization: Not on file     Attends meetings of clubs or organizations: Not on file     Relationship status: Not on file   Other Topics Concern    Not on file   Social History Narrative    Not on file       ROS:  GENERAL: No weight changes. No swelling. No fatigue. No fever.  CARDIOVASCULAR: No chest pain. No shortness  of breath. No leg cramps.   NEUROLOGICAL: No headaches. No vision changes.  BREASTS: No pain. No lumps. No discharge.  ABDOMEN: No pain. No nausea. No vomiting. No diarrhea. No constipation.  REPRODUCTIVE: No abnormal bleeding.   VULVA: No pain. No lesions. No itching.  VAGINA:  No itching. No odor. No discharge. No lesions.  URINARY:  No dysuria.    /72   Wt 60.1 kg (132 lb 7.9 oz)   LMP 06/09/2020   BMI 24.23 kg/m²     PE:  APPEARANCE: Well nourished, well developed, in no acute distress.  ABDOMEN: Soft. No tenderness or masses. No hepatosplenomegaly. No hernias.  BREASTS, FUNDOSCOPIC, RECTAL DEFERRED  PELVIC: External female genitalia without lesions.  Female hair distribution. Adequate perineal body, Normal urethral meatus. Vagina moist and well rugated without lesions or discharge.  Cervix pink without lesions, discharge or tenderness. Uterus is normal size, regular, mobile and nontender. Adnexa without masses or tenderness.  EXTREMITIES: No edema      DIAGNOSIS & PLAN  1. Menometrorrhagia  Full code    Place in Outpatient    Vital signs    Insert peripheral IV    [facility] to [place]    Notify Physician/Vital Signs Parameters Urine output less than 0.5mL/kg/hr (with indwelling catheter) or 30 mL/hr (without indwelling catheter) or blood glucose greater than 200 mg/dL    Notify physician    Notify Physician - Potential Need of Opioid Reversal    Diet NPO    Chlorohexidine Gluconate Bath    Pregnancy, urine rapid    Place JAVIER hose    Place sequential compression device    CBC auto differential    Basic metabolic panel    Type & Screen Pre Op   2. Pelvic pain  Full code    Place in Outpatient    Vital signs    Insert peripheral IV    [facility]y to [place]    Notify Physician/Vital Signs Parameters Urine output less than 0.5mL/kg/hr (with indwelling catheter) or 30 mL/hr (without indwelling catheter) or blood glucose greater than 200 mg/dL    Notify physician    Notify Physician - Potential Need of Opioid Reversal     Diet NPO    Chlorohexidine Gluconate Bath    Pregnancy, urine rapid    Place AJVIER hose    Place sequential compression device    CBC auto differential    Basic metabolic panel    Type & Screen Pre Op   3. Fibroids, intramural  Full code    Place in Outpatient    Vital signs    Insert peripheral IV    [facility] to [place]    Notify Physician/Vital Signs Parameters Urine output less than 0.5mL/kg/hr (with indwelling catheter) or 30 mL/hr (without indwelling catheter) or blood glucose greater than 200 mg/dL    Notify physician    Notify Physician - Potential Need of Opioid Reversal    Diet NPO    Chlorohexidine Gluconate Bath    Pregnancy, urine rapid    Place JAVIER hose    Place sequential compression device    CBC auto differential    Basic metabolic panel    Type & Screen Pre Op   4. Fibroids, subserous  Full code    Place in Outpatient    Vital signs    Insert peripheral IV    [facility] to [place]    Notify Physician/Vital Signs Parameters Urine output less than 0.5mL/kg/hr (with indwelling catheter) or 30 mL/hr (without indwelling catheter) or blood glucose greater than 200 mg/dL    Notify physician    Notify Physician - Potential Need of Opioid Reversal    Diet NPO    Chlorohexidine Gluconate Bath    Pregnancy, urine rapid    Place JAVIER hose    Place sequential compression device    CBC auto differential    Basic metabolic panel    Type & Screen Pre Op           COUNSELING:  Patient counseled for RALH/bilateral salpingectomy. Counseled on risks/benefits of BSO.  Desires ovarian conservation unless abnormal at the time of surgery.  Counseled on operative risks of laparotomy, infection, bleeding, transfusion, organ injury and anesthesia complications.  She desires to proceed.

## 2020-07-06 ENCOUNTER — TELEPHONE (OUTPATIENT)
Dept: OBSTETRICS AND GYNECOLOGY | Facility: HOSPITAL | Age: 45
End: 2020-07-06

## 2020-07-06 NOTE — TELEPHONE ENCOUNTER
----- Message from Christian Ba LPN sent at 7/1/2020  2:52 PM CDT -----  Regarding: FW: MD ORDERS FOR DOS    ----- Message -----  From: Saul Guevara RN  Sent: 7/1/2020   2:49 PM CDT  To: Christian Ba LPN  Subject: FW: MD ORDERS FOR DOS                              ----- Message -----  From: Jarret Brown MD  Sent: 7/1/2020   2:48 PM CDT  To: Saul Guevara RN  Subject: RE: MD ORDERS FOR DOS                            This is for V Brown  ----- Message -----  From: Saul Guevara RN  Sent: 7/1/2020   1:41 PM CDT  To: Jarret Brown MD  Subject: FW: MD ORDERS FOR DOS                              ----- Message -----  From: Carito Arora RN  Sent: 7/1/2020   1:34 PM CDT  To: Stephanie Wheat Staff  Subject: MD ORDERS FOR DOS                                Can you please let Dr. Brown know his orders for this patient were deleted due to the patient having two cases for surgery on same day and the cases needed to be merged to one case with both Dr. Brown and Dr. Castillo. He had orders in there but after the merge his orders were gone.     Thank you,    Josselyn Schwartz.

## 2020-07-11 ENCOUNTER — LAB VISIT (OUTPATIENT)
Dept: URGENT CARE | Facility: CLINIC | Age: 45
End: 2020-07-11
Payer: COMMERCIAL

## 2020-07-11 DIAGNOSIS — Z01.818 PREOP TESTING: ICD-10-CM

## 2020-07-11 PROCEDURE — U0003 INFECTIOUS AGENT DETECTION BY NUCLEIC ACID (DNA OR RNA); SEVERE ACUTE RESPIRATORY SYNDROME CORONAVIRUS 2 (SARS-COV-2) (CORONAVIRUS DISEASE [COVID-19]), AMPLIFIED PROBE TECHNIQUE, MAKING USE OF HIGH THROUGHPUT TECHNOLOGIES AS DESCRIBED BY CMS-2020-01-R: HCPCS

## 2020-07-11 NOTE — PROGRESS NOTES
Subjective:       Patient ID: Christelle Reyes is a 44 y.o. female.    Vitals:  vitals were not taken for this visit.     Chief Complaint: COVID-19 Concerns (Pre op)    Patient requires COVID testing for Pre op    ROS    Objective:      Physical Exam      Assessment:       1. Preop testing        Plan:         Preop testing  -     COVID-19 Routine Screening

## 2020-07-13 LAB — SARS-COV-2 RNA RESP QL NAA+PROBE: NOT DETECTED

## 2020-07-14 ENCOUNTER — ANESTHESIA (OUTPATIENT)
Dept: SURGERY | Facility: OTHER | Age: 45
End: 2020-07-14
Payer: COMMERCIAL

## 2020-07-14 ENCOUNTER — HOSPITAL ENCOUNTER (OUTPATIENT)
Facility: OTHER | Age: 45
Discharge: HOME | End: 2020-07-15
Attending: OBSTETRICS & GYNECOLOGY | Admitting: OBSTETRICS & GYNECOLOGY
Payer: COMMERCIAL

## 2020-07-14 DIAGNOSIS — N92.1 MENOMETRORRHAGIA: ICD-10-CM

## 2020-07-14 DIAGNOSIS — N81.4 UTEROVAGINAL PROLAPSE: ICD-10-CM

## 2020-07-14 DIAGNOSIS — N81.6 RECTOCELE, FEMALE: ICD-10-CM

## 2020-07-14 DIAGNOSIS — N93.9 ABNORMAL UTERINE BLEEDING: Primary | ICD-10-CM

## 2020-07-14 DIAGNOSIS — N81.11 CYSTOCELE, MIDLINE: ICD-10-CM

## 2020-07-14 DIAGNOSIS — Z98.890 POST-OPERATIVE STATE: ICD-10-CM

## 2020-07-14 DIAGNOSIS — R10.2 PELVIC PAIN: ICD-10-CM

## 2020-07-14 DIAGNOSIS — N39.46 URINARY INCONTINENCE, MIXED: ICD-10-CM

## 2020-07-14 DIAGNOSIS — D21.9 FIBROIDS: ICD-10-CM

## 2020-07-14 LAB
B-HCG UR QL: NEGATIVE
CTP QC/QA: YES

## 2020-07-14 PROCEDURE — C1771 REP DEV, URINARY, W/SLING: HCPCS | Performed by: OBSTETRICS & GYNECOLOGY

## 2020-07-14 PROCEDURE — 57425 LAPAROSCOPY SURG COLPOPEXY: CPT | Mod: AS,,, | Performed by: PHYSICIAN ASSISTANT

## 2020-07-14 PROCEDURE — 58571 TLH W/T/O 250 G OR LESS: CPT | Mod: AS,,, | Performed by: PHYSICIAN ASSISTANT

## 2020-07-14 PROCEDURE — 88305 TISSUE EXAM BY PATHOLOGIST: CPT | Performed by: PATHOLOGY

## 2020-07-14 PROCEDURE — 57250 REPAIR RECTUM & VAGINA: CPT | Mod: AS,51,, | Performed by: PHYSICIAN ASSISTANT

## 2020-07-14 PROCEDURE — 88307 TISSUE EXAM BY PATHOLOGIST: CPT | Performed by: PATHOLOGY

## 2020-07-14 PROCEDURE — 36000713 HC OR TIME LEV V EA ADD 15 MIN: Performed by: OBSTETRICS & GYNECOLOGY

## 2020-07-14 PROCEDURE — 88307 PR  SURG PATH,LEVEL V: ICD-10-PCS | Mod: 26,,, | Performed by: PATHOLOGY

## 2020-07-14 PROCEDURE — 63600175 PHARM REV CODE 636 W HCPCS: Performed by: OBSTETRICS & GYNECOLOGY

## 2020-07-14 PROCEDURE — 27201423 OPTIME MED/SURG SUP & DEVICES STERILE SUPPLY: Performed by: OBSTETRICS & GYNECOLOGY

## 2020-07-14 PROCEDURE — 58571 PR LAPAROSCOPY W TOT HYSTERECTUTERUS <=250 GRAM  W TUBE/OVARY: ICD-10-PCS | Mod: AS,,, | Performed by: PHYSICIAN ASSISTANT

## 2020-07-14 PROCEDURE — 25000003 PHARM REV CODE 250: Performed by: OBSTETRICS & GYNECOLOGY

## 2020-07-14 PROCEDURE — 36000712 HC OR TIME LEV V 1ST 15 MIN: Performed by: OBSTETRICS & GYNECOLOGY

## 2020-07-14 PROCEDURE — 57250 PR POST COLPORRHAPHY,RECTUM/VAGINA: ICD-10-PCS | Mod: 51,,, | Performed by: OBSTETRICS & GYNECOLOGY

## 2020-07-14 PROCEDURE — 25000003 PHARM REV CODE 250: Performed by: PHYSICIAN ASSISTANT

## 2020-07-14 PROCEDURE — 71000033 HC RECOVERY, INTIAL HOUR: Performed by: OBSTETRICS & GYNECOLOGY

## 2020-07-14 PROCEDURE — 63600175 PHARM REV CODE 636 W HCPCS: Performed by: PHYSICIAN ASSISTANT

## 2020-07-14 PROCEDURE — 63600175 PHARM REV CODE 636 W HCPCS: Performed by: SPECIALIST

## 2020-07-14 PROCEDURE — 25000003 PHARM REV CODE 250: Performed by: REGISTERED NURSE

## 2020-07-14 PROCEDURE — 57425 LAPAROSCOPY SURG COLPOPEXY: CPT | Mod: ,,, | Performed by: OBSTETRICS & GYNECOLOGY

## 2020-07-14 PROCEDURE — 57288 PR SLING OPER STRES INCONTINENCE: ICD-10-PCS | Mod: 51,,, | Performed by: OBSTETRICS & GYNECOLOGY

## 2020-07-14 PROCEDURE — 57288 REPAIR BLADDER DEFECT: CPT | Mod: AS,51,, | Performed by: PHYSICIAN ASSISTANT

## 2020-07-14 PROCEDURE — 57288 PR SLING OPER STRES INCONTINENCE: ICD-10-PCS | Mod: AS,51,, | Performed by: PHYSICIAN ASSISTANT

## 2020-07-14 PROCEDURE — 37000009 HC ANESTHESIA EA ADD 15 MINS: Performed by: OBSTETRICS & GYNECOLOGY

## 2020-07-14 PROCEDURE — 71000039 HC RECOVERY, EACH ADD'L HOUR: Performed by: OBSTETRICS & GYNECOLOGY

## 2020-07-14 PROCEDURE — 57425 PR LAPAROSCOPY, SURG, COLPOPEXY: ICD-10-PCS | Mod: AS,,, | Performed by: PHYSICIAN ASSISTANT

## 2020-07-14 PROCEDURE — 63600175 PHARM REV CODE 636 W HCPCS: Performed by: REGISTERED NURSE

## 2020-07-14 PROCEDURE — 94761 N-INVAS EAR/PLS OXIMETRY MLT: CPT

## 2020-07-14 PROCEDURE — 37000008 HC ANESTHESIA 1ST 15 MINUTES: Performed by: OBSTETRICS & GYNECOLOGY

## 2020-07-14 PROCEDURE — 57288 REPAIR BLADDER DEFECT: CPT | Mod: 51,,, | Performed by: OBSTETRICS & GYNECOLOGY

## 2020-07-14 PROCEDURE — 58571 PR LAPAROSCOPY W TOT HYSTERECTUTERUS <=250 GRAM  W TUBE/OVARY: ICD-10-PCS | Mod: ,,, | Performed by: OBSTETRICS & GYNECOLOGY

## 2020-07-14 PROCEDURE — C1765 ADHESION BARRIER: HCPCS | Performed by: OBSTETRICS & GYNECOLOGY

## 2020-07-14 PROCEDURE — 57425 PR LAPAROSCOPY, SURG, COLPOPEXY: ICD-10-PCS | Mod: ,,, | Performed by: OBSTETRICS & GYNECOLOGY

## 2020-07-14 PROCEDURE — 63600175 PHARM REV CODE 636 W HCPCS: Performed by: ANESTHESIOLOGY

## 2020-07-14 PROCEDURE — 81025 URINE PREGNANCY TEST: CPT | Performed by: SPECIALIST

## 2020-07-14 PROCEDURE — 25000003 PHARM REV CODE 250: Performed by: SPECIALIST

## 2020-07-14 PROCEDURE — 57250 REPAIR RECTUM & VAGINA: CPT | Mod: 51,,, | Performed by: OBSTETRICS & GYNECOLOGY

## 2020-07-14 PROCEDURE — 57250 PR POST COLPORRHAPHY,RECTUM/VAGINA: ICD-10-PCS | Mod: AS,51,, | Performed by: PHYSICIAN ASSISTANT

## 2020-07-14 PROCEDURE — 58571 TLH W/T/O 250 G OR LESS: CPT | Mod: ,,, | Performed by: OBSTETRICS & GYNECOLOGY

## 2020-07-14 PROCEDURE — 94799 UNLISTED PULMONARY SVC/PX: CPT

## 2020-07-14 PROCEDURE — 88307 TISSUE EXAM BY PATHOLOGIST: CPT | Mod: 26,,, | Performed by: PATHOLOGY

## 2020-07-14 DEVICE — SLING TRANSVAGINAL ADVANTAGE: Type: IMPLANTABLE DEVICE | Site: PERITONEUM | Status: FUNCTIONAL

## 2020-07-14 DEVICE — BARRIER GYNECARE INTCEED ABSRB: Type: IMPLANTABLE DEVICE | Site: PERITONEUM | Status: FUNCTIONAL

## 2020-07-14 RX ORDER — DEXAMETHASONE SODIUM PHOSPHATE 4 MG/ML
INJECTION, SOLUTION INTRA-ARTICULAR; INTRALESIONAL; INTRAMUSCULAR; INTRAVENOUS; SOFT TISSUE
Status: DISCONTINUED | OUTPATIENT
Start: 2020-07-14 | End: 2020-07-14

## 2020-07-14 RX ORDER — IBUPROFEN 600 MG/1
600 TABLET ORAL EVERY 6 HOURS
Status: DISCONTINUED | OUTPATIENT
Start: 2020-07-14 | End: 2020-07-15 | Stop reason: HOSPADM

## 2020-07-14 RX ORDER — ONDANSETRON 8 MG/1
8 TABLET, ORALLY DISINTEGRATING ORAL EVERY 8 HOURS PRN
Status: DISCONTINUED | OUTPATIENT
Start: 2020-07-14 | End: 2020-07-15 | Stop reason: HOSPADM

## 2020-07-14 RX ORDER — DIPHENHYDRAMINE HYDROCHLORIDE 50 MG/ML
25 INJECTION INTRAMUSCULAR; INTRAVENOUS EVERY 4 HOURS PRN
Status: DISCONTINUED | OUTPATIENT
Start: 2020-07-14 | End: 2020-07-15 | Stop reason: HOSPADM

## 2020-07-14 RX ORDER — PROPOFOL 10 MG/ML
INJECTION, EMULSION INTRAVENOUS
Status: DISCONTINUED | OUTPATIENT
Start: 2020-07-14 | End: 2020-07-14

## 2020-07-14 RX ORDER — SODIUM CHLORIDE 9 MG/ML
INJECTION, SOLUTION INTRAVENOUS CONTINUOUS
Status: DISCONTINUED | OUTPATIENT
Start: 2020-07-14 | End: 2020-07-14

## 2020-07-14 RX ORDER — SODIUM CHLORIDE 0.9 % (FLUSH) 0.9 %
3 SYRINGE (ML) INJECTION
Status: DISCONTINUED | OUTPATIENT
Start: 2020-07-14 | End: 2020-07-14

## 2020-07-14 RX ORDER — OXYCODONE AND ACETAMINOPHEN 5; 325 MG/1; MG/1
1 TABLET ORAL EVERY 4 HOURS PRN
Status: DISCONTINUED | OUTPATIENT
Start: 2020-07-14 | End: 2020-07-15 | Stop reason: HOSPADM

## 2020-07-14 RX ORDER — DEXTROSE MONOHYDRATE, SODIUM CHLORIDE, AND POTASSIUM CHLORIDE 50; 1.49; 4.5 G/1000ML; G/1000ML; G/1000ML
INJECTION, SOLUTION INTRAVENOUS CONTINUOUS
Status: DISCONTINUED | OUTPATIENT
Start: 2020-07-14 | End: 2020-07-15 | Stop reason: HOSPADM

## 2020-07-14 RX ORDER — CELECOXIB 200 MG/1
400 CAPSULE ORAL
Status: COMPLETED | OUTPATIENT
Start: 2020-07-14 | End: 2020-07-14

## 2020-07-14 RX ORDER — MUPIROCIN 20 MG/G
OINTMENT TOPICAL
Status: DISCONTINUED | OUTPATIENT
Start: 2020-07-14 | End: 2020-07-14 | Stop reason: HOSPADM

## 2020-07-14 RX ORDER — NEOSTIGMINE METHYLSULFATE 1 MG/ML
INJECTION, SOLUTION INTRAVENOUS
Status: DISCONTINUED | OUTPATIENT
Start: 2020-07-14 | End: 2020-07-14

## 2020-07-14 RX ORDER — LIDOCAINE HYDROCHLORIDE 10 MG/ML
0.5 INJECTION, SOLUTION EPIDURAL; INFILTRATION; INTRACAUDAL; PERINEURAL ONCE
Status: DISCONTINUED | OUTPATIENT
Start: 2020-07-14 | End: 2020-07-14 | Stop reason: HOSPADM

## 2020-07-14 RX ORDER — ONDANSETRON 2 MG/ML
4 INJECTION INTRAMUSCULAR; INTRAVENOUS DAILY PRN
Status: DISCONTINUED | OUTPATIENT
Start: 2020-07-14 | End: 2020-07-14 | Stop reason: HOSPADM

## 2020-07-14 RX ORDER — ACETAMINOPHEN 500 MG
1000 TABLET ORAL
Status: COMPLETED | OUTPATIENT
Start: 2020-07-14 | End: 2020-07-14

## 2020-07-14 RX ORDER — ROCURONIUM BROMIDE 10 MG/ML
INJECTION, SOLUTION INTRAVENOUS
Status: DISCONTINUED | OUTPATIENT
Start: 2020-07-14 | End: 2020-07-14

## 2020-07-14 RX ORDER — FENTANYL CITRATE 50 UG/ML
INJECTION, SOLUTION INTRAMUSCULAR; INTRAVENOUS
Status: DISCONTINUED | OUTPATIENT
Start: 2020-07-14 | End: 2020-07-14

## 2020-07-14 RX ORDER — GLYCOPYRROLATE 0.2 MG/ML
INJECTION INTRAMUSCULAR; INTRAVENOUS
Status: DISCONTINUED | OUTPATIENT
Start: 2020-07-14 | End: 2020-07-14

## 2020-07-14 RX ORDER — OXYCODONE HYDROCHLORIDE 5 MG/1
5 TABLET ORAL ONCE AS NEEDED
Status: COMPLETED | OUTPATIENT
Start: 2020-07-14 | End: 2020-07-14

## 2020-07-14 RX ORDER — DIPHENHYDRAMINE HCL 25 MG
25 CAPSULE ORAL EVERY 4 HOURS PRN
Status: DISCONTINUED | OUTPATIENT
Start: 2020-07-14 | End: 2020-07-15 | Stop reason: HOSPADM

## 2020-07-14 RX ORDER — MIDAZOLAM HYDROCHLORIDE 1 MG/ML
INJECTION, SOLUTION INTRAMUSCULAR; INTRAVENOUS
Status: DISCONTINUED | OUTPATIENT
Start: 2020-07-14 | End: 2020-07-14

## 2020-07-14 RX ORDER — HYDROMORPHONE HYDROCHLORIDE 1 MG/ML
1 INJECTION, SOLUTION INTRAMUSCULAR; INTRAVENOUS; SUBCUTANEOUS EVERY 6 HOURS PRN
Status: DISCONTINUED | OUTPATIENT
Start: 2020-07-14 | End: 2020-07-15 | Stop reason: HOSPADM

## 2020-07-14 RX ORDER — PREGABALIN 50 MG/1
50 CAPSULE ORAL
Status: COMPLETED | OUTPATIENT
Start: 2020-07-14 | End: 2020-07-14

## 2020-07-14 RX ORDER — FAMOTIDINE 20 MG/1
20 TABLET, FILM COATED ORAL
Status: COMPLETED | OUTPATIENT
Start: 2020-07-14 | End: 2020-07-14

## 2020-07-14 RX ORDER — CEFAZOLIN SODIUM 1 G/3ML
2 INJECTION, POWDER, FOR SOLUTION INTRAMUSCULAR; INTRAVENOUS
Status: COMPLETED | OUTPATIENT
Start: 2020-07-14 | End: 2020-07-14

## 2020-07-14 RX ORDER — ONDANSETRON 2 MG/ML
INJECTION INTRAMUSCULAR; INTRAVENOUS
Status: DISCONTINUED | OUTPATIENT
Start: 2020-07-14 | End: 2020-07-14

## 2020-07-14 RX ORDER — LIDOCAINE HCL/PF 100 MG/5ML
SYRINGE (ML) INTRAVENOUS
Status: DISCONTINUED | OUTPATIENT
Start: 2020-07-14 | End: 2020-07-14

## 2020-07-14 RX ORDER — SODIUM CHLORIDE, SODIUM LACTATE, POTASSIUM CHLORIDE, CALCIUM CHLORIDE 600; 310; 30; 20 MG/100ML; MG/100ML; MG/100ML; MG/100ML
INJECTION, SOLUTION INTRAVENOUS CONTINUOUS
Status: DISCONTINUED | OUTPATIENT
Start: 2020-07-14 | End: 2020-07-14

## 2020-07-14 RX ORDER — MEPERIDINE HYDROCHLORIDE 25 MG/ML
12.5 INJECTION INTRAMUSCULAR; INTRAVENOUS; SUBCUTANEOUS ONCE AS NEEDED
Status: DISCONTINUED | OUTPATIENT
Start: 2020-07-14 | End: 2020-07-14 | Stop reason: HOSPADM

## 2020-07-14 RX ORDER — OXYCODONE AND ACETAMINOPHEN 10; 325 MG/1; MG/1
1 TABLET ORAL EVERY 4 HOURS PRN
Status: DISCONTINUED | OUTPATIENT
Start: 2020-07-14 | End: 2020-07-15 | Stop reason: HOSPADM

## 2020-07-14 RX ORDER — HYDROMORPHONE HYDROCHLORIDE 2 MG/ML
0.4 INJECTION, SOLUTION INTRAMUSCULAR; INTRAVENOUS; SUBCUTANEOUS EVERY 5 MIN PRN
Status: DISCONTINUED | OUTPATIENT
Start: 2020-07-14 | End: 2020-07-14 | Stop reason: HOSPADM

## 2020-07-14 RX ADMIN — HYDROMORPHONE HYDROCHLORIDE 0.4 MG: 2 INJECTION, SOLUTION INTRAMUSCULAR; INTRAVENOUS; SUBCUTANEOUS at 12:07

## 2020-07-14 RX ADMIN — FAMOTIDINE 20 MG: 20 TABLET, FILM COATED ORAL at 05:07

## 2020-07-14 RX ADMIN — MUPIROCIN: 20 OINTMENT TOPICAL at 05:07

## 2020-07-14 RX ADMIN — FENTANYL CITRATE 25 MCG: 50 INJECTION, SOLUTION INTRAMUSCULAR; INTRAVENOUS at 07:07

## 2020-07-14 RX ADMIN — ONDANSETRON 8 MG: 8 TABLET, ORALLY DISINTEGRATING ORAL at 04:07

## 2020-07-14 RX ADMIN — OXYCODONE HYDROCHLORIDE AND ACETAMINOPHEN 1 TABLET: 5; 325 TABLET ORAL at 11:07

## 2020-07-14 RX ADMIN — ROCURONIUM BROMIDE 10 MG: 10 SOLUTION INTRAVENOUS at 08:07

## 2020-07-14 RX ADMIN — ROCURONIUM BROMIDE 50 MG: 10 SOLUTION INTRAVENOUS at 07:07

## 2020-07-14 RX ADMIN — ROCURONIUM BROMIDE 10 MG: 10 SOLUTION INTRAVENOUS at 09:07

## 2020-07-14 RX ADMIN — OXYCODONE HYDROCHLORIDE AND ACETAMINOPHEN 1 TABLET: 5; 325 TABLET ORAL at 04:07

## 2020-07-14 RX ADMIN — PREGABALIN 50 MG: 50 CAPSULE ORAL at 05:07

## 2020-07-14 RX ADMIN — CELECOXIB 400 MG: 200 CAPSULE ORAL at 05:07

## 2020-07-14 RX ADMIN — FENTANYL CITRATE 25 MCG: 50 INJECTION, SOLUTION INTRAMUSCULAR; INTRAVENOUS at 10:07

## 2020-07-14 RX ADMIN — SODIUM CHLORIDE, SODIUM LACTATE, POTASSIUM CHLORIDE, AND CALCIUM CHLORIDE: 600; 310; 30; 20 INJECTION, SOLUTION INTRAVENOUS at 10:07

## 2020-07-14 RX ADMIN — CEFAZOLIN 2 G: 330 INJECTION, POWDER, FOR SOLUTION INTRAMUSCULAR; INTRAVENOUS at 07:07

## 2020-07-14 RX ADMIN — PROMETHAZINE HYDROCHLORIDE 12.5 MG: 25 INJECTION INTRAMUSCULAR; INTRAVENOUS at 04:07

## 2020-07-14 RX ADMIN — GLYCOPYRROLATE 0.6 MG: 0.2 INJECTION, SOLUTION INTRAMUSCULAR; INTRAVENOUS at 11:07

## 2020-07-14 RX ADMIN — IBUPROFEN 600 MG: 600 TABLET, FILM COATED ORAL at 11:07

## 2020-07-14 RX ADMIN — HYDROMORPHONE HYDROCHLORIDE 0.4 MG: 2 INJECTION, SOLUTION INTRAMUSCULAR; INTRAVENOUS; SUBCUTANEOUS at 11:07

## 2020-07-14 RX ADMIN — DEXAMETHASONE SODIUM PHOSPHATE 4 MG: 4 INJECTION, SOLUTION INTRAMUSCULAR; INTRAVENOUS at 07:07

## 2020-07-14 RX ADMIN — SODIUM CHLORIDE, SODIUM LACTATE, POTASSIUM CHLORIDE, AND CALCIUM CHLORIDE: 600; 310; 30; 20 INJECTION, SOLUTION INTRAVENOUS at 06:07

## 2020-07-14 RX ADMIN — CEFAZOLIN 2 G: 330 INJECTION, POWDER, FOR SOLUTION INTRAMUSCULAR; INTRAVENOUS at 11:07

## 2020-07-14 RX ADMIN — OXYCODONE HYDROCHLORIDE AND ACETAMINOPHEN 1 TABLET: 5; 325 TABLET ORAL at 08:07

## 2020-07-14 RX ADMIN — PROPOFOL 180 MG: 10 INJECTION, EMULSION INTRAVENOUS at 07:07

## 2020-07-14 RX ADMIN — LIDOCAINE HYDROCHLORIDE 100 MG: 20 INJECTION, SOLUTION INTRAVENOUS at 07:07

## 2020-07-14 RX ADMIN — MIDAZOLAM 2 MG: 1 INJECTION INTRAMUSCULAR; INTRAVENOUS at 06:07

## 2020-07-14 RX ADMIN — ROCURONIUM BROMIDE 10 MG: 10 SOLUTION INTRAVENOUS at 07:07

## 2020-07-14 RX ADMIN — FENTANYL CITRATE 100 MCG: 50 INJECTION, SOLUTION INTRAMUSCULAR; INTRAVENOUS at 07:07

## 2020-07-14 RX ADMIN — POTASSIUM CHLORIDE, DEXTROSE MONOHYDRATE AND SODIUM CHLORIDE: 150; 5; 450 INJECTION, SOLUTION INTRAVENOUS at 12:07

## 2020-07-14 RX ADMIN — ONDANSETRON 4 MG: 2 INJECTION INTRAMUSCULAR; INTRAVENOUS at 10:07

## 2020-07-14 RX ADMIN — IBUPROFEN 600 MG: 600 TABLET, FILM COATED ORAL at 06:07

## 2020-07-14 RX ADMIN — ACETAMINOPHEN 1000 MG: 500 TABLET, FILM COATED ORAL at 05:07

## 2020-07-14 RX ADMIN — FENTANYL CITRATE 50 MCG: 50 INJECTION, SOLUTION INTRAMUSCULAR; INTRAVENOUS at 09:07

## 2020-07-14 RX ADMIN — OXYCODONE 5 MG: 5 TABLET ORAL at 12:07

## 2020-07-14 RX ADMIN — NEOSTIGMINE METHYLSULFATE 4 MG: 1 INJECTION INTRAVENOUS at 11:07

## 2020-07-14 RX ADMIN — CARBOXYMETHYLCELLULOSE SODIUM 1 DROP: 2.5 SOLUTION/ DROPS OPHTHALMIC at 07:07

## 2020-07-14 RX ADMIN — FENTANYL CITRATE 25 MCG: 50 INJECTION, SOLUTION INTRAMUSCULAR; INTRAVENOUS at 08:07

## 2020-07-14 NOTE — INTERVAL H&P NOTE
The patient has been examined and the H&P has been reviewed:    I concur with the findings and no changes have occurred since H&P was written.    Anesthesia/Surgery risks, benefits and alternative options discussed and understood by patient.    Melody Jose MD  OBGYN, PGY-3      There are no hospital problems to display for this patient.

## 2020-07-14 NOTE — ANESTHESIA PROCEDURE NOTES
Intubation  Performed by: Bobbi Kay CRNA  Authorized by: Quentin Camarillo MD     Intubation:     Induction:  Intravenous    Intubated:  Postinduction    Mask Ventilation:  Easy mask    Attempts:  1    Attempted By:  CRNA    Method of Intubation:  Video laryngoscopy    Blade:  Fernández 3    Laryngeal View Grade: Grade I - full view of chords      Difficult Airway Encountered?: No      Complications:  None    Airway Device:  Oral endotracheal tube    Airway Device Size:  7.5    Style/Cuff Inflation:  Cuffed (inflated to minimal occlusive pressure)    Inflation Amount (mL):  5    Tube secured:  20    Secured at:  The lips    Placement Verified By:  Capnometry    Complicating Factors:  None    Findings Post-Intubation:  BS equal bilateral and atraumatic/condition of teeth unchanged

## 2020-07-14 NOTE — TRANSFER OF CARE
"Anesthesia Transfer of Care Note    Patient: Christelle Reyes    Procedure(s) Performed: Procedure(s) (LRB):  uterosacral suspension (N/A)  COLPORRHAPHY, COMBINED ANTEROPOSTERIOR  (N/A)  SLING, MIDURETHRAL (N/A)  ROBOTIC HYSTERECTOMY (N/A)  CYSTOSCOPY (N/A)  ROBOTIC SALPINGECTOMY (Bilateral)    Patient location: PACU    Anesthesia Type: general    Transport from OR: Transported from OR on room air with adequate spontaneous ventilation    Post pain: adequate analgesia    Post assessment: no apparent anesthetic complications    Post vital signs: stable    Level of consciousness: awake    Nausea/Vomiting: no nausea/vomiting    Complications: none    Transfer of care protocol was followed      Last vitals:   Visit Vitals  BP (!) 137/94 (BP Location: Right arm, Patient Position: Sitting)   Pulse 94   Temp 36.6 °C (97.9 °F) (Tympanic)   Resp 16   Ht 5' 2" (1.575 m)   Wt 59.9 kg (132 lb)   SpO2 97%   Breastfeeding No   BMI 24.14 kg/m²     "

## 2020-07-14 NOTE — ANESTHESIA POSTPROCEDURE EVALUATION
Anesthesia Post Evaluation    Patient: Christelle Reyes    Procedure(s) Performed: Procedure(s) (LRB):  uterosacral suspension (N/A)  COLPORRHAPHY, COMBINED ANTEROPOSTERIOR  (N/A)  SLING, MIDURETHRAL (N/A)  ROBOTIC HYSTERECTOMY (N/A)  CYSTOSCOPY (N/A)  ROBOTIC SALPINGECTOMY (Bilateral)    Final Anesthesia Type: general    Patient location during evaluation: PACU  Patient participation: Yes- Able to Participate  Level of consciousness: awake and alert and oriented  Post-procedure vital signs: reviewed and stable  Pain management: adequate  Airway patency: patent    PONV status at discharge: No PONV  Anesthetic complications: no      Cardiovascular status: blood pressure returned to baseline and hemodynamically stable  Respiratory status: unassisted, spontaneous ventilation and room air  Hydration status: euvolemic  Follow-up not needed.          Vitals Value Taken Time   /84 07/14/20 1324   Temp 37 °C (98.6 °F) 07/14/20 1324   Pulse 94 07/14/20 1324   Resp 16 07/14/20 1324   SpO2 95 % 07/14/20 1324         Event Time   Out of Recovery 13:08:20         Pain/Handy Score: Pain Rating Prior to Med Admin: 6 (7/14/2020 12:36 PM)  Pain Rating Post Med Admin: 2 (7/14/2020 12:47 PM)  Handy Score: 9 (7/14/2020 12:49 PM)

## 2020-07-14 NOTE — OR NURSING
Pt resting with eyes closed, awakens easily to verbal stimuli. Rates abdominal pain 2/10, no c/o nausea and VSS, ready for transfer to inpatient. , Jarrod, updated on pt status and sent to inpatient room 614. Report called to KIRA Borden and pt transported to room via stretcher with nurse @ bedside

## 2020-07-14 NOTE — OPERATIVE NOTE ADDENDUM
Certification of Assistant at Surgery       Surgery Date: 7/14/2020     Participating Surgeons:  Surgeon(s) and Role:  Panel 1:     * Pieter Castillo MD - Primary  Panel 2:     * Aurora Brown MD - Primary     * Melody Jose MD - Resident - Assisting    Procedures:  Procedure(s) (LRB):  COLPOPEXY; robotic/laparoscopic uterosacral suspension (N/A)  COLPORRHAPHY, COMBINED ANTEROPOSTERIOR  (N/A)  SLING, MIDURETHRAL (N/A)  ROBOTIC HYSTERECTOMY (N/A)  CYSTOSCOPY (N/A)  ROBOTIC SALPINGECTOMY (Bilateral)    Assistant Surgeon's Certification of Necessity:  I understand that section 1842 (b) (6) (d) of the Social Security Act generally prohibits Medicare Part B reasonable charge payment for the services of assistants at surgery in teaching hospitals when qualified residents are available to furnish such services. I certify that the services for which payment is claimed were medically necessary, and that no qualified resident was available to perform the services. I further understand that these services are subject to post-payment review by the Medicare carrier.      Jas Orellana PA-C    07/14/2020  10:38 AM

## 2020-07-14 NOTE — OP NOTE
Title of Operation:  1)  Robotic-assisted total laparoscopic hysterectomy with bilateral salpingectomy (Brown)  2)  Robotic-assisted laparoscopic bilateral uterosacral vaginal vault suspension (Knoepp)  3)  Placement of retropubic tension-free midurethral sling, Advantage ([employer]) (Knpepp)  4)  Posterior repair with perineorrhaphy (Knoepp)  5)  Cystourethroscopy (Knoepp)    Indications for Surgery:  1)  UI:  (+) SYMONE < (+) UUI  X years.  (+) pads:2/day, usually minimum wetness but has irritation with min wetness. Daytime frequency: Q 30 min- 1 hours.  Nocturia: Yes: 2/night.   (--) dysuria,  (--) hematuria,  (--) frequent UTIs.  (--) complete bladder emptying. DV to help with min 2nd volume.   --2nd  was precipitous; had 3-4 months of issues with bladder (increased U/F, UUI--took ditropan temporarily)     2)  POP:  Present. To introitus.  Symptoms:(+)  Mild fullness.  +AUB. (--) vaginal discharge. (+) sexually active.  (--) dyspareunia.  (--)  Vaginal dryness.  (--) vaginal estrogen use.   --POP-Q:  Aa 0; Ba 0; C -1; Ap -1; Bp -1; D -6.  Genital hiatus 4, perineal body 2, total vaginal length 10-11.     3)  BM:  (+) constipation/straining. Takes stool softener daily with good control. Eats high fiber cereal PRN.   (--) chronic diarrhea. (--) hematochezia.  (--) fecal incontinence.  (--) fecal smearing/urgency.  (+) complete evacuation.      4)  Planning hyst 2020.  Would like an PF surgery at same time.   Cycles progressively heavier last few years.  For the last year has had breakthrough bleeding that lasts up to 2 weeks.  Occasionally has RLQ pain about prison through pill pack that usually correlates with breakthrough bleeding.  2020:  The uterus is normal in size and measures 7.6 x 5.5 x 5.5 cm.  The endometrial stripe is normal and measures 3mm.  There are multiple intramural and subserosal fibroids.  The largest fibroid is along the posterior body of the uterus and measures 2.7 x 1.5 x  2.2 cm.  Along the anterior body of the uterus is a 1.9 x 2 x 2.6 cm fibroid.  Along the left body of the uterus is a 1.7 x 1.9 x 2 cm fibroid and along the anterior fundus of the uterus a 2 x 1.7 x 1.4 fibroid. The ovaries are normal in size and appearance. The right ovary measures 2.5 x 1.8 x 1.8 cm.   The left ovary measures 1.8 x 1.3 x 1.3cm.    There is appropriate flow in the ovaries.  Impression: No finding to correlate with the history of pain. Multiple uterine fibroids  3/2020 EMBx benign    Preoperative Diagnosis:  1. Urinary incontinence, mixed    2. Cystocele, midline    3. Rectocele, female    4. Uterovaginal prolapse    5. Abnormal uterine bleeding    6. Chronic constipation    7. Nocturia more than twice per night    8.    Urodynamic stress incontinence    Postoperative Diagnosis:  1. Urinary incontinence, mixed    2. Cystocele, midline    3. Rectocele, female    4. Uterovaginal prolapse    5. Abnormal uterine bleeding    6. Chronic constipation    7. Nocturia more than twice per night    8.    Urodynamic stress incontinence    Anesthesia:  General endotracheal anesthesia.  Additionally,a dilute solution of 1% lidocaine with epinephrine was injected vaginally for local anesthesia.    Specimen (Bacteriological, Pathological or other):  Uterus and cervix   Bilateral fallopian tubes    Prosthetic Device/Implant:  1)  Advantage sling.  LOT# [id number].      Surgeons Narrative:    Surgeon: Pieter Castillo MD    Assistants:  Elin Ortiz MD (PGY3). MUKUND Riddle (insufficient resident assistance).      Intravenous Fluids:  1500 mL     Estimated Blood Loss:  60 mL     Urine Output:  310 mL     Counts:  Sponge, lap, needle counts correct x 2.     Drains: Remy catheter.     Disposition:  The patient was sent to the PACU in stable condition.     Findings:     1.  On exam under anesthesia,  normal external female genitalia. There was prolapse as previously noted in clinic.  Uterus and cervix: Normal size   Adnexa: non palpable     2.  On laparoscopic survey:    --The bowel was grossly Normal.    --The appendix was not visualized.    --The uterus and cervix. Bilateral tubes were absent per concomitant hysterectomy.  Bilateral ovaries were grossly normal.    --Bilateral ureters were visualized and noted to vermiculate at the start and close of the case.    --Adhesions were absent.    --Other findings included:  none     3.  On cystoscopy, the bladder mucosa was Normal.  After RTLH/BS/USS/sling, there was no suture or mesh within the bladder mucosa.  The ureteral orifices were visualized bilaterally with (+) noted good efflux x 2. On a systematic survey of the bladder dome to the base of the urethrovesical junction, there were not other abnormalities noted. The urethra was normal on retraction of the scope.    4.  Rectal exam:  At the close of the case, rectal exam was performed.  There was no suture, mesh, or other injury noted on exam.  No obvious masses were palpated.     Description of procedure:    The patient was identified in the preoperative area where informed consent was confirmed, and she was taken to the operating room where an adequate level of general anesthesia was obtained.  The patient was positioned in lithotomy position with legs in Ru stirrups. Care was taken to avoid joint hyperflexion or hyperextension, and all extremity surfaces were carefully padded so as to minimize risk of neurologic injury. Intravenous antibiotics were administered preoperatively. Sequential compression devices were applied to the patient's lower extremities preoperatively and heparin was administered subcutaneously for VTE prophylaxis.  Surgical time-out was performed, where the patient was identified and procedures confirmed.  An examination under anesthesia was performed with findings described as above.  The patient's abdomen, perineum, and vagina were sterilely prepped and draped. A iniguez catheter was placed in the  bladder for drainage.      The procedure commenced with robotic-assisted total laparoscopic hysterectomy with bilateral salpingectomy.  Please see Dr. Brown' note for further detail.     Next, we proceeded with the urogynecologic portion of the case.  We started with laparoscopic uterosacral suspension.  Bilateral uterosacral ligaments were identified, and care was taken to avoid damage to them during this process.  A 0-Prolene suture was placed through the right uterosacral ligament at the level of the ischial spine and then passed through the posterior and anterior vaginal cuff angle, making sure not to pass the suture through full thickness of the vaginal epithelium.  Next, a 2-0 PDS suture was placed through the right uterosacral ligament about 1 cm distal and medial to the prolene stitch and passed completely through the posterior and anterior vaginal cuff at the right cuff angle.  Both sutures were tied down, resulting in excellent elevation of the right vaginal apex.  The same procedure was performed on the patient's left side, passing a 0-Prolene stitch more cephalad and lateral to the 2-0 PDS stitch.  Both sutures were tied down, resulting in excellent elevation of the right vaginal apex.   At this point, bilateral ureters were noted to vermiculate well.  The vaginal cuff was check manually and noted to be intact, without defect, and well-elevated at the apex after the uterosacral suspension.  The laparoscopic instruments were placed on standby, and cystourethroscopy was performed.  There were no abnormalities noted, and excellent efflux from bilateral ureteral orifices was seen after intravenous injection of intravenous indigo carmine.      At this point, laparoscopic portion of the procedure was completed. The pelvis was irrigated, and all irrigants were removed. Hemoblast was placed along all placed along all planes of dissection and suture lines to encourage continued hemostasis, and Interceed was  placed over the same areas to discourage future adhesion formation. All abdominal incisions were <1 cm, and fascial closure was unnecessary. The abdomen was deflated and all laparoscopic sleeves and other instruments were removed from the abdomen. All laparoscopic skin incisions were closed with subcuticular stitches of 4-0 Monocryl and secured with steri-strips and band-aids.  Excellent cosmesis and hemostasis was noted.            We then turned our attention vaginally.  The anterior wall and apex were well-supported from the uterosacral suspension. Therefore, we proceeded with placement of the Advantage Fit midurethral sling. The skin was marked just above the symphysis pubis, 2 cm laterally on either side of the midline indicating the exit sites for the trocars.  The Remy catheter was palpated at the urethrovesical junction, and 2 Allis clamps were placed at the anterior vaginal wall along the midurethra. The Remy catheter was removed from the patient's bladder. The vaginal epithelium between the two Allis clamps was injected with the 1% lidocaine with epinephrine.  Metzenbaum scissors were used to dissect the vaginal epithelium off the underlying pubocervical muscular tissue in the direction of the angle formed between the ischiopubic ramus and the pubic bone bilaterally. The rigid catheter guide was used to deviate the bladder neck and urethra to the patient's left while the right trocar was advanced to the dissected tract in the patient's right side.  Once the urogenital membrane was perforated, the trocar and handle were reoriented in the sagittal plane and the tip of the trocar was advanced through the retropubic space in intimate proximity to the pubic bone.  The trocar was then advanced through the skin approximately 2 cm to the right of the midline just above the pubic symphysis. The passage of the Advantage Fit trocar was repeated on the patient's left hand side in a similar fashion, using the  catheter guide to deviate the bladder neck to the right.  At this point, cystourethroscopy was performed. Cystoscopy was negative for injury after infusion of at least 300 mL in the bladder.  The ureteral orifices were noted to have good efflux bilaterally.  The bladder was then drained. With a #10 Hegar dilator placed between the sling mesh and the urethra, the arms of the sling were elevated above the pubic symphysis and the plastic sheaths were removed from the mesh arms.  Excess mesh was trimmed beneath the suprapubic skin.  The Hegar dilator ensured that the mesh sling was placed in a tension-free manner.  The vaginal mucosa overlying the sling mesh was closed with a several mattress stitches of 2-0 Vicryl with excellent hemostasis noted.  The suprapubic incisions were closed with dermabond.    Finally, we performed the posterior colporrhaphy and perineorrhaphy.  Allis clamps were placed on the left and right hymenal remnants and at the proximal limit of the rectovaginal septal defect along the vaginal mucosa. The perineal skin to be resected for the perineorrhaphy was grasped with several Allis clamps.  The perineum and vaginal mucosa were injected with 1% lidocaine with epinephrine, which was distributed beneath the perineal skin and vaginal mucosa both in the midline and in the direction of the fornices. The perineal skin within the outlined area was dissected off the underlying perineal body with Metzenbaum scissors.  The posterior vaginal mucosa was then incised in the midline with Metzenbaum scissors.  The left and right margins of the vaginal mucosa were grasped with Allis clamps and the vaginal mucosa was dissected sharply off of the underlying rectovaginal fibromuscular connective tissue.  Individual areas of bleeding were made hemostatic with the electrocautery.  The rectovaginal fibromuscular tissue was exposed bilaterally to the margins of the levator ani muscles and plicated in the midline with a  series of vertical mattress stitches of 0-vicryl.  As we approached the introitus,we brought together the perineal body in the midline between the hymenal remnants.  A series of vertical mattress stitches were used to plicate the perineal body beneath the perineal skin thereby plicating the superficial and deep transverse perineal muscles and bulbocavernosus muscles.  Care was taken to make sure that vaginal caliber/ introitus allowed 2 - 3 fingerbreadths to be placed without difficulty. There was no significant posterior vaginal wall contracture/ ridging at the completion of the plication. The vaginal mucosal margins of the vertical incision were trimmed with Metzenbaum scissors to remove less than 1/2 centimeter of reduntant mucosa on each side and reapproximated with a running, locking stitch of 2-0 Vicryl.  Excellent hemostasis was observed along the suture line. Vaginal exam confirmed good reinforcement of the rectocele site without ridging or significant contracture of the vault.  Rectovaginal examination was performed with findings as described above.    At the close of the case, all counts were correct x2.  The vagina was irrigated, and all irrigants were removed.  The vagina was packed with a role of Kerlix, coated with Premarin cream for assurance of immediate postop hemostasis.  The Remy was in place and draining well.  The patient was awakened from general endotracheal anesthesia and was taken to the Recovery Room in stable condition.  She tolerated the procedure well.    I was present and scrubbed for the entire procedure.

## 2020-07-15 ENCOUNTER — PATIENT MESSAGE (OUTPATIENT)
Dept: UROGYNECOLOGY | Facility: CLINIC | Age: 45
End: 2020-07-15

## 2020-07-15 VITALS
SYSTOLIC BLOOD PRESSURE: 116 MMHG | HEART RATE: 102 BPM | HEIGHT: 62 IN | RESPIRATION RATE: 16 BRPM | OXYGEN SATURATION: 99 % | BODY MASS INDEX: 24.29 KG/M2 | WEIGHT: 132 LBS | DIASTOLIC BLOOD PRESSURE: 74 MMHG | TEMPERATURE: 97 F

## 2020-07-15 LAB
BASOPHILS # BLD AUTO: 0.04 K/UL (ref 0–0.2)
BASOPHILS NFR BLD: 0.3 % (ref 0–1.9)
DIFFERENTIAL METHOD: ABNORMAL
EOSINOPHIL # BLD AUTO: 0 K/UL (ref 0–0.5)
EOSINOPHIL NFR BLD: 0.2 % (ref 0–8)
ERYTHROCYTE [DISTWIDTH] IN BLOOD BY AUTOMATED COUNT: 13.1 % (ref 11.5–14.5)
HCT VFR BLD AUTO: 39 % (ref 37–48.5)
HGB BLD-MCNC: 12.5 G/DL (ref 12–16)
IMM GRANULOCYTES # BLD AUTO: 0.05 K/UL (ref 0–0.04)
IMM GRANULOCYTES NFR BLD AUTO: 0.4 % (ref 0–0.5)
LYMPHOCYTES # BLD AUTO: 2.8 K/UL (ref 1–4.8)
LYMPHOCYTES NFR BLD: 20.2 % (ref 18–48)
MCH RBC QN AUTO: 28.8 PG (ref 27–31)
MCHC RBC AUTO-ENTMCNC: 32.1 G/DL (ref 32–36)
MCV RBC AUTO: 90 FL (ref 82–98)
MONOCYTES # BLD AUTO: 1.1 K/UL (ref 0.3–1)
MONOCYTES NFR BLD: 8.3 % (ref 4–15)
NEUTROPHILS # BLD AUTO: 9.7 K/UL (ref 1.8–7.7)
NEUTROPHILS NFR BLD: 70.6 % (ref 38–73)
NRBC BLD-RTO: 0 /100 WBC
PLATELET # BLD AUTO: 217 K/UL (ref 150–350)
PMV BLD AUTO: 10.4 FL (ref 9.2–12.9)
RBC # BLD AUTO: 4.34 M/UL (ref 4–5.4)
WBC # BLD AUTO: 13.78 K/UL (ref 3.9–12.7)

## 2020-07-15 PROCEDURE — 25000003 PHARM REV CODE 250: Performed by: STUDENT IN AN ORGANIZED HEALTH CARE EDUCATION/TRAINING PROGRAM

## 2020-07-15 PROCEDURE — 25000003 PHARM REV CODE 250: Performed by: PHYSICIAN ASSISTANT

## 2020-07-15 PROCEDURE — 85025 COMPLETE CBC W/AUTO DIFF WBC: CPT

## 2020-07-15 PROCEDURE — 36415 COLL VENOUS BLD VENIPUNCTURE: CPT

## 2020-07-15 RX ORDER — SIMETHICONE 80 MG
1 TABLET,CHEWABLE ORAL 3 TIMES DAILY PRN
Status: DISCONTINUED | OUTPATIENT
Start: 2020-07-15 | End: 2020-07-15 | Stop reason: HOSPADM

## 2020-07-15 RX ORDER — NITROFURANTOIN 25; 75 MG/1; MG/1
100 CAPSULE ORAL DAILY
Qty: 7 CAPSULE | Refills: 0 | Status: SHIPPED | OUTPATIENT
Start: 2020-07-15 | End: 2020-07-22

## 2020-07-15 RX ORDER — OXYCODONE AND ACETAMINOPHEN 5; 325 MG/1; MG/1
1 TABLET ORAL EVERY 4 HOURS PRN
Qty: 30 TABLET | Refills: 0 | Status: SHIPPED | OUTPATIENT
Start: 2020-07-15 | End: 2020-07-29

## 2020-07-15 RX ORDER — IBUPROFEN 600 MG/1
600 TABLET ORAL EVERY 6 HOURS
Qty: 30 TABLET | Refills: 2 | Status: SHIPPED | OUTPATIENT
Start: 2020-07-15 | End: 2021-03-08

## 2020-07-15 RX ORDER — PHENAZOPYRIDINE HYDROCHLORIDE 100 MG/1
100 TABLET, FILM COATED ORAL
Qty: 30 TABLET | Refills: 0 | Status: SHIPPED | OUTPATIENT
Start: 2020-07-15 | End: 2020-07-25

## 2020-07-15 RX ORDER — DOCUSATE SODIUM 100 MG/1
100 CAPSULE, LIQUID FILLED ORAL 2 TIMES DAILY
Qty: 60 CAPSULE | Refills: 0 | Status: SHIPPED | OUTPATIENT
Start: 2020-07-15 | End: 2020-08-14

## 2020-07-15 RX ADMIN — OXYCODONE HYDROCHLORIDE AND ACETAMINOPHEN 1 TABLET: 5; 325 TABLET ORAL at 09:07

## 2020-07-15 RX ADMIN — SIMETHICONE CHEW TAB 80 MG 80 MG: 80 TABLET ORAL at 01:07

## 2020-07-15 RX ADMIN — IBUPROFEN 600 MG: 600 TABLET, FILM COATED ORAL at 11:07

## 2020-07-15 RX ADMIN — SIMETHICONE CHEW TAB 80 MG 80 MG: 80 TABLET ORAL at 12:07

## 2020-07-15 RX ADMIN — IBUPROFEN 600 MG: 600 TABLET, FILM COATED ORAL at 05:07

## 2020-07-15 RX ADMIN — POTASSIUM CHLORIDE, DEXTROSE MONOHYDRATE AND SODIUM CHLORIDE: 150; 5; 450 INJECTION, SOLUTION INTRAVENOUS at 12:07

## 2020-07-15 RX ADMIN — OXYCODONE HYDROCHLORIDE AND ACETAMINOPHEN 1 TABLET: 5; 325 TABLET ORAL at 05:07

## 2020-07-15 RX ADMIN — OXYCODONE HYDROCHLORIDE AND ACETAMINOPHEN 1 TABLET: 5; 325 TABLET ORAL at 01:07

## 2020-07-15 NOTE — DISCHARGE SUMMARY
[facility]  Obstetrics & Gynecology  Discharge Summary    Patient Name: Christelle Reyes  MRN: [id number]  Admission Date: 7/14/2020  Hospital Length of Stay: 0 days  Discharge Date and Time:  07/15/2020 6:54 AM  Attending Physician: Pieter Castillo MD   Discharging Provider: Ashlie Ortiz MD  Primary Care Provider: Juani Mansfield DO    HPI:   Patient presented for scheduled procedure. Patient was passed back to OR for below scheduled procedures. Please see OP note for further details. Tolerated procedure well and patient was taken to recovery in a stable condition. Prior to discharge patient was able to void, ambulate, tolerate PO and pain was well controlled with PO meds. Patient was given routine post-op instructions for which patient voiced understanding. Patient was subsequently discharged home.        Procedure(s) (LRB):  uterosacral suspension (N/A)  COLPORRHAPHY, COMBINED ANTEROPOSTERIOR  (N/A)  SLING, MIDURETHRAL (N/A)  ROBOTIC HYSTERECTOMY (N/A)  CYSTOSCOPY (N/A)  ROBOTIC SALPINGECTOMY (Bilateral)     Consults:     Significant Diagnostic Studies: Labs:   CBC   Recent Labs   Lab 07/15/20  0354   WBC 13.78*   HGB 12.5   HCT 39.0          Pending Diagnostic Studies:     Procedure Component Value Units Date/Time    Specimen to Pathology, Surgery Gynecology and Obstetrics [[id number]] Collected: 07/14/20 1120    Order Status: Sent Lab Status: In process Updated: 07/14/20 1326        Final Active Diagnoses:    Diagnosis Date Noted POA    Menometrorrhagia [N92.1] 07/14/2020 Yes      Problems Resolved During this Admission:        Discharged Condition: good    Disposition:     Follow Up:  Follow-up Information     Pieter Castillo MD In 6 weeks.    Specialties: Gynecology, Urology  Why: post op visit  Contact information:  [address] [place] [address] [phone]                 Patient Instructions:      Pelvic Rest     Notify your health care provider if you  experience any of the following:  temperature >100.4     Notify your health care provider if you experience any of the following:  persistent nausea and vomiting or diarrhea     Notify your health care provider if you experience any of the following:  severe uncontrolled pain     Notify your health care provider if you experience any of the following:  redness, tenderness, or signs of infection (pain, swelling, redness, odor or green/yellow discharge around incision site)     Notify your health care provider if you experience any of the following:  difficulty breathing or increased cough     Notify your health care provider if you experience any of the following:  severe persistent headache     Notify your health care provider if you experience any of the following:  persistent dizziness, light-headedness, or visual disturbances     Notify your health care provider if you experience any of the following:  increased confusion or weakness     Notify your health care provider if you experience any of the following:   Order Comments: Heavy vaginal bleeding >1 pad/hour for greater than 2 hours     Activity as tolerated     Medications:  Reconciled Home Medications:      Medication List      START taking these medications    oxyCODONE-acetaminophen 5-325 mg per tablet  Commonly known as: PERCOCET  Take 1 tablet by mouth every 4 (four) hours as needed.        CHANGE how you take these medications    * ibuprofen 100 MG tablet  Commonly known as: ADVIL,MOTRIN  Take 100 mg by mouth every 6 (six) hours as needed.  What changed: Another medication with the same name was added. Make sure you understand how and when to take each.     * ibuprofen 600 MG tablet  Commonly known as: ADVIL,MOTRIN  Take 1 tablet (600 mg total) by mouth every 6 (six) hours.  What changed: You were already taking a medication with the same name, and this prescription was added. Make sure you understand how and when to take each.         * This list has 2  medication(s) that are the same as other medications prescribed for you. Read the directions carefully, and ask your doctor or other care provider to review them with you.            CONTINUE taking these medications    FLUZONE QUAD [id number] (PF) 60 mcg (15 mcg x 4)/0.5 mL Syrg  Generic drug: flu vacc [id number] 6mos up(PF)  TO BE ADMINISTERED BY PHARMACIST FOR IMMUNIZATION     GREEN TEA EXTRACT ORAL  Take by mouth.     loratadine 10 mg tablet  Commonly known as: CLARITIN  Take 10 mg by mouth once daily.     STOOL SOFTENER ORAL  Take by mouth.        STOP taking these medications    norethindrone-ethinyl estradiol-iron 1.5 mg-30 mcg (21)/75 mg (7) tablet  Commonly known as: MICROGESTIN FE1.5/30            Ashlie Ortiz MD  Obstetrics & Gynecology  [facility]

## 2020-07-16 ENCOUNTER — TELEPHONE (OUTPATIENT)
Dept: UROGYNECOLOGY | Facility: CLINIC | Age: 45
End: 2020-07-16

## 2020-07-16 LAB
FINAL PATHOLOGIC DIAGNOSIS: NORMAL
GROSS: NORMAL

## 2020-07-16 NOTE — TELEPHONE ENCOUNTER
----- Message from Sara Holloway sent at 7/16/2020  8:51 AM CDT -----  Name of Who is Calling: JOSEPH ZAMORA [[id number]]      What is the request in detail: Pt is calling to speak to staff in regards to scheduling a urine study and pt states she has been having blood in her urine .... Please call to further assist .       Can the clinic reply by [facility]: Y       What Number to Call Back if not in [facility]: [phone]

## 2020-07-16 NOTE — TELEPHONE ENCOUNTER
Spoke to pt, voiding trial scheduled tomorrow 7/17/2020 at 8:30a at the [facility] location, pt is aware and verbalizes understanding.

## 2020-07-17 ENCOUNTER — OFFICE VISIT (OUTPATIENT)
Dept: UROGYNECOLOGY | Facility: CLINIC | Age: 45
End: 2020-07-17
Payer: COMMERCIAL

## 2020-07-17 ENCOUNTER — PATIENT MESSAGE (OUTPATIENT)
Dept: UROGYNECOLOGY | Facility: CLINIC | Age: 45
End: 2020-07-17

## 2020-07-17 VITALS
BODY MASS INDEX: 24.46 KG/M2 | DIASTOLIC BLOOD PRESSURE: 84 MMHG | HEIGHT: 62 IN | WEIGHT: 132.94 LBS | SYSTOLIC BLOOD PRESSURE: 138 MMHG

## 2020-07-17 DIAGNOSIS — N39.46 URINARY INCONTINENCE, MIXED: Primary | ICD-10-CM

## 2020-07-17 PROCEDURE — 99024 POSTOP FOLLOW-UP VISIT: CPT | Mod: S$GLB,,, | Performed by: OBSTETRICS & GYNECOLOGY

## 2020-07-17 PROCEDURE — 51700 IRRIGATION OF BLADDER: CPT | Mod: S$GLB,,, | Performed by: OBSTETRICS & GYNECOLOGY

## 2020-07-17 PROCEDURE — 99999 PR PBB SHADOW E&M-EST. PATIENT-LVL IV: CPT | Mod: PBBFAC,,, | Performed by: OBSTETRICS & GYNECOLOGY

## 2020-07-17 PROCEDURE — 51700 PR IRRIGATION, BLADDER: ICD-10-PCS | Mod: S$GLB,,, | Performed by: OBSTETRICS & GYNECOLOGY

## 2020-07-17 PROCEDURE — 99999 PR PBB SHADOW E&M-EST. PATIENT-LVL IV: ICD-10-PCS | Mod: PBBFAC,,, | Performed by: OBSTETRICS & GYNECOLOGY

## 2020-07-17 PROCEDURE — 87086 URINE CULTURE/COLONY COUNT: CPT

## 2020-07-17 PROCEDURE — 99024 PR POST-OP FOLLOW-UP VISIT: ICD-10-PCS | Mod: S$GLB,,, | Performed by: OBSTETRICS & GYNECOLOGY

## 2020-07-17 NOTE — OP NOTE
DATE OF PROCEDURE:  7/14/2020     SURGEON: Aurora Brown.        ASSISTANTS: Melody Jose MD (resident), MUKUND Riddle (insufficient resident assistance)     PREOPERATIVE DIAGNOSES: menometrorragia, pelvic pain, fibroids     POSTOPERATIVE DIAGNOSES: same     PROCEDURES:  Robotic-assisted total laparoscopic hysterectomy and bilateral Salpingectomy.    Concurrent procedure with Dr Castillo (please see her op note for details)     COMPLICATIONS: None     ESTIMATED BLOOD LOSS: 50 cc     ANESTHESIA: GETA     INTRAOPERATIVE FINDINGS:  12 cm uterus with fibroids.  She had normal bilateral tubes and ovaries.      PROCEDURE IN DETAIL: Informed consent was obtained and the patient was taken to the Operating Suite.  General anesthesia was administered.  Once felt to be   adequate, she was placed in dorsal lithotomy position with her arms tucked.  The abdomen and pelvis were prepped and draped in the usual fashion and a speculum   was placed in the vagina.  The cervix was visualized, grasped with a single-tooth tenaculum and the uterus sounded to approximately 8 cm.  Serial dilation of cervix was performed and a large VCare manipulator was placed without difficulty.  Remy catheter was placed to gravity drainage and attention was turned to the abdominal portion of the procedure. A Veress needle was placed through the umbilicus an opening pressure was 1.  Pneumoperitoneum was obtained with carbon dioxide and once this was felt to be adequate, an 8 mm incision was made above the umbilicus and a robotic trocar was placed through this.  Intraperitoneal placement was confirmed with the camera.  Lateral robotic trocars x 2 were placed through 8 mm incisions.  A left upper quadrant 8 mm AirSeal accessory port was placed.  The patient was placed in steep Trendelenburg.  The robot was docked and instruments were passed in the operative field.  Findings as above were noted.  Attention was first turned to the left side where the left  round ligament was coagulated with bipolar cautery then ligated with monopolar scissors. The broad ligament was ligated and the left ureter identified.  The left tube was grasped and the mesosalpynx coagulated and ligated. The left utero-ovarian ligament was then coagulated with bipolar cautery and then ligated with monopolar scissors.     Attention was turned to the right side, which was taken down in an identical manner.    Anteriorly, the vesicouterine peritoneum was incised and the bladder was mobilized inferiorly to below the V-care ring. Uterine vessels were coagulated and ligated with bipolar cautery.  Monopolar scissors were used to perform circumferential colpotomy at the V-care ring.  Specimen removed intact vaginally.  The cuff was then closed with a 0 PDS suture tied in the midline.  The pelvis was irrigated and noted to be hemostatic.  Remainder of procedure performed and dictated by Dr. Castillo.  Of note, I was present for and performed all key aspects of this procedure.

## 2020-07-17 NOTE — PROGRESS NOTES
Patient was placed in dorsal lithotomy position with her feet in stirrups. Pt's catheter bag was removed a some urine from the catheter was put in a sterile cup and sent off for a urine culture. The bladder was then filled with 200ml of sterile water with a 50ml Remy tipped syringe, at which she voiced a strong urge to urinate. The pt's catheter was then removed. She then voided 300mls in the hat. Patient tolerated the procedure well. Pt voided without difficulty. Dr. Castillo was informed and spoke with patient.        Plan:   1. Follow post-op instructions  2. Follow up with a 6week post-op appointment.

## 2020-07-17 NOTE — PROGRESS NOTES
Voiding trial:    The patient was placed in dorsal lithotomy position with feet in stirrups.  The catheter tubing was disconnected from the indwelling portion of the catheter, and a urine sample was collected for C&S from the remaining indwelling drainage.   The bladder was filled with 200 mL sterile water to gravity with a 60 mL iniguez tipped syringe, at which point the patient voiced a strong desire to void.  The catheter was removed. The patient was asked to void on the toilet, with 300 mL produced in the hat without issue.  She states that she voided without difficulty.  The patient tolerated the procedure well.    Impression: passed voiding trial    Plan:  1)  RTC 2 weeks for postop with Dr. Brown.   2)  RTC 6 weeks for postop with Anna.   3)  Given ED precautions.  Call if issues voiding today.    4)  Discussed BM postop.  Take MOM if urge but no BM by this weekend. If that's unsuccessful, call us.     Voiding trial performed by Apurva Worrell RN.  I was present for crucial parts of trial and discussion.   Pieter Castillo MD

## 2020-07-19 LAB — BACTERIA UR CULT: NO GROWTH

## 2020-07-20 ENCOUNTER — PATIENT MESSAGE (OUTPATIENT)
Dept: OBSTETRICS AND GYNECOLOGY | Facility: HOSPITAL | Age: 45
End: 2020-07-20

## 2020-07-23 ENCOUNTER — PATIENT MESSAGE (OUTPATIENT)
Dept: OBSTETRICS AND GYNECOLOGY | Facility: CLINIC | Age: 45
End: 2020-07-23

## 2020-07-25 RX ORDER — IBUPROFEN 600 MG/1
600 TABLET ORAL EVERY 6 HOURS PRN
Qty: 60 TABLET | Refills: 2 | Status: SHIPPED | OUTPATIENT
Start: 2020-07-25 | End: 2020-07-29

## 2020-07-29 ENCOUNTER — OFFICE VISIT (OUTPATIENT)
Dept: OBSTETRICS AND GYNECOLOGY | Facility: CLINIC | Age: 45
End: 2020-07-29
Attending: OBSTETRICS & GYNECOLOGY
Payer: COMMERCIAL

## 2020-07-29 VITALS
DIASTOLIC BLOOD PRESSURE: 70 MMHG | HEIGHT: 62 IN | WEIGHT: 130.94 LBS | SYSTOLIC BLOOD PRESSURE: 110 MMHG | BODY MASS INDEX: 24.09 KG/M2

## 2020-07-29 DIAGNOSIS — Z98.890 POST-OPERATIVE STATE: Primary | ICD-10-CM

## 2020-07-29 PROCEDURE — 99999 PR PBB SHADOW E&M-EST. PATIENT-LVL III: ICD-10-PCS | Mod: PBBFAC,,, | Performed by: OBSTETRICS & GYNECOLOGY

## 2020-07-29 PROCEDURE — 99999 PR PBB SHADOW E&M-EST. PATIENT-LVL III: CPT | Mod: PBBFAC,,, | Performed by: OBSTETRICS & GYNECOLOGY

## 2020-07-29 PROCEDURE — 99024 PR POST-OP FOLLOW-UP VISIT: ICD-10-PCS | Mod: S$GLB,,, | Performed by: OBSTETRICS & GYNECOLOGY

## 2020-07-29 PROCEDURE — 99024 POSTOP FOLLOW-UP VISIT: CPT | Mod: S$GLB,,, | Performed by: OBSTETRICS & GYNECOLOGY

## 2020-07-29 NOTE — PROGRESS NOTES
CC: Postop visit    Christelle Reyes is a 44 y.o. female  post-op from a RAL/  on 20.  Concurrent uterosacral suspension, A&P repair, sling, by Dr. Castillo.  Patient is doing well postoperatively.   Some discomfort with having BMs.  No blood in stool.  Emptying bladder well.  Some leakage but better than before surgery.  No fever.   Only taking motrin at bedtime - no other pain meds.    Pathology: benign    History reviewed. No pertinent past medical history.  Past Surgical History:   Procedure Laterality Date    COLPOPEXY N/A 2020    Procedure: UTEROSARCAL SUSPENSION;  Surgeon: Pieter Castillo MD;  Location: [facility];  Service: OB/GYN;  Laterality: N/A;  please add to end of VÍCTOR Brown case 2020; Anna to do Urogyn procedures for patient after hysterctomy     INSERTION OF MIDURETHRAL SLING N/A 2020    Procedure: SLING, MIDURETHRAL;  Surgeon: Pieter Castillo MD;  Location: [facility] OR;  Service: OB/GYN;  Laterality: N/A;  + cystoscopy; please add to end of V Brown case 2020; Anna to do Urogyn procedures for patient after hysterctomy     MOUTH SURGERY      age 13 or 14    ROBOT-ASSISTED LAPAROSCOPIC HYSTERECTOMY N/A 2020    Procedure: ROBOTIC HYSTERECTOMY;  Surgeon: Aurora Brown MD;  Location: [facility];  Service: OB/GYN;  Laterality: N/A;    ROBOT-ASSISTED SALPINGECTOMY Bilateral 2020    Procedure: ROBOTIC SALPINGECTOMY;  Surgeon: Aurora Brown MD;  Location: [facility];  Service: OB/GYN;  Laterality: Bilateral;     Family History   Problem Relation Age of Onset    Brain cancer Paternal Grandfather     Thyroid disease Maternal Grandfather     Hypertension Father     Diabetes Father     Melanoma Maternal Aunt     Skin cancer Maternal Uncle     Glaucoma Maternal Grandmother     Cataracts Maternal Grandmother     Hyperlipidemia Mother     Hyperthyroidism Sister     Amblyopia Neg Hx     Blindness Neg Hx     Cancer Neg Hx     Macular degeneration Neg Hx  "    Retinal detachment Neg Hx     Strabismus Neg Hx     Stroke Neg Hx     Breast cancer Neg Hx     Colon cancer Neg Hx     Ovarian cancer Neg Hx      Social History     Tobacco Use    Smoking status: Never Smoker    Smokeless tobacco: Never Used   Substance Use Topics    Alcohol use: Not Currently     Alcohol/week: 0.0 standard drinks     Comment: rarely    Drug use: No     OB History    Para Term  AB Living   3 3 3     3   SAB TAB Ectopic Multiple Live Births           3      # Outcome Date GA Lbr Chirag/2nd Weight Sex Delivery Anes PTL Lv   3 Term 11 40w0d   F Vag-Spont  N JOSTIN   2 Term 05 40w3d   M Vag-Spont  N JOSTIN   1 Term 01 40w0d   M Vag-Spont  N JOSTIN       /70   Ht 5' 2" (1.575 m)   Wt 59.4 kg (130 lb 15.3 oz)   LMP 2020   BMI 23.95 kg/m²     ROS:  GENERAL: No fever, chills, fatigability or weight loss.  VULVAR: No pain, no lesions and no itching.  VAGINAL: No relaxation, no itching, no discharge, no abnormal bleeding and no lesions.  ABDOMEN: No abdominal pain. Denies nausea. Denies vomiting. No diarrhea. No constipation  BREAST: Denies pain. No lumps. No discharge.  URINARY: No incontinence, no nocturia, no frequency and no dysuria.  CARDIOVASCULAR: No chest pain. No shortness of breath. No leg cramps.  NEUROLOGICAL: No headaches. No vision changes.    PE:   GEN:  NAD, A&O x 3  ABDOMEN:  Soft, non-tender, non-distended.  Incisions healing well.  PSYCH:  Appropriate mood and affect      ICD-10-CM ICD-9-CM    1. Post-operative state  Z98.890 V45.89          Patient doing well.  Follow-up 4 weeks with Dr. Castillo    "

## 2020-07-29 NOTE — LETTER
July 29, 2020      [facility] [address]  [address] [facility] [address]  Phone: [phone]       Patient: Christelle Reyes   YOB: 1975  Date of Visit: 07/29/2020    To Whom It May Concern:    Miley Reyes  was at [facility] on 07/29/2020. She may return to work on 8/12/2020. She can not lift anything over 5 lbs until after 9/15/2020 . If you have any questions or concerns, or if I can be of further assistance, please do not hesitate to contact me.    Sincerely,          Aurora Brown MD

## 2020-08-18 ENCOUNTER — PATIENT MESSAGE (OUTPATIENT)
Dept: UROGYNECOLOGY | Facility: CLINIC | Age: 45
End: 2020-08-18

## 2020-08-19 NOTE — TELEPHONE ENCOUNTER
Reviewed with patient importance of limiting sitting straight up on incisions to help decrease vaginal pressure. Denies bleeding. Will change positions frequently and cut back hours until her post op visit. Jenny Rush, JONHP-BC

## 2020-08-28 ENCOUNTER — OFFICE VISIT (OUTPATIENT)
Dept: UROGYNECOLOGY | Facility: CLINIC | Age: 45
End: 2020-08-28
Payer: COMMERCIAL

## 2020-08-28 VITALS
SYSTOLIC BLOOD PRESSURE: 132 MMHG | DIASTOLIC BLOOD PRESSURE: 85 MMHG | WEIGHT: 131.63 LBS | BODY MASS INDEX: 24.22 KG/M2 | HEIGHT: 62 IN

## 2020-08-28 DIAGNOSIS — K59.09 CHRONIC CONSTIPATION: ICD-10-CM

## 2020-08-28 DIAGNOSIS — R35.1 NOCTURIA MORE THAN TWICE PER NIGHT: Primary | ICD-10-CM

## 2020-08-28 PROCEDURE — 99999 PR PBB SHADOW E&M-EST. PATIENT-LVL III: ICD-10-PCS | Mod: PBBFAC,,, | Performed by: OBSTETRICS & GYNECOLOGY

## 2020-08-28 PROCEDURE — 99999 PR PBB SHADOW E&M-EST. PATIENT-LVL III: CPT | Mod: PBBFAC,,, | Performed by: OBSTETRICS & GYNECOLOGY

## 2020-08-28 PROCEDURE — 99024 PR POST-OP FOLLOW-UP VISIT: ICD-10-PCS | Mod: S$GLB,,, | Performed by: OBSTETRICS & GYNECOLOGY

## 2020-08-28 PROCEDURE — 99024 POSTOP FOLLOW-UP VISIT: CPT | Mod: S$GLB,,, | Performed by: OBSTETRICS & GYNECOLOGY

## 2020-08-28 NOTE — PROGRESS NOTES
Urogyn follow up  2020    [id number]   [address] [address]    Christelle Reyes  [id number]  1975      Christelle Reyes is a 44 y.o.  here for a urogyn follow up.    2020:  Title of Operation:  1)  Robotic-assisted total laparoscopic hysterectomy with bilateral salpingectomy (Brown)  2)  Robotic-assisted laparoscopic bilateral uterosacral vaginal vault suspension (Knoepp)  3)  Placement of retropubic tension-free midurethral sling, Advantage (Etopus) (Knpepp)  4)  Posterior repair with perineorrhaphy (Knoepp)  5)  Cystourethroscopy (Knoepp)     Indications for Surgery:  1)  UI:  (+) SYMONE < (+) UUI  X years.  (+) pads:2/day, usually minimum wetness but has irritation with min wetness. Daytime frequency: Q 30 min- 1 hours.  Nocturia: Yes: 2/night.   (--) dysuria,  (--) hematuria,  (--) frequent UTIs.  (--) complete bladder emptying. DV to help with min 2nd volume.   --2nd  was precipitous; had 3-4 months of issues with bladder (increased U/F, UUI--took ditropan temporarily)     2)  POP:  Present. To introitus.  Symptoms:(+)  Mild fullness.  +AUB. (--) vaginal discharge. (+) sexually active.  (--) dyspareunia.  (--)  Vaginal dryness.  (--) vaginal estrogen use.   --POP-Q:  Aa 0; Ba 0; C -1; Ap -1; Bp -1; D -6.  Genital hiatus 4, perineal body 2, total vaginal length 10-11.     3)  BM:  (+) constipation/straining. Takes stool softener daily with good control. Eats high fiber cereal PRN.   (--) chronic diarrhea. (--) hematochezia.  (--) fecal incontinence.  (--) fecal smearing/urgency.  (+) complete evacuation.      4)  Planning hyst 2020.  Would like an PF surgery at same time.   Cycles progressively heavier last few years.  For the last year has had breakthrough bleeding that lasts up to 2 weeks.  Occasionally has RLQ pain about [facility] through pill pack that usually correlates with breakthrough bleeding.  2020:  The  uterus is normal in size and measures 7.6 x 5.5 x 5.5 cm.  The endometrial stripe is normal and measures 3mm.  There are multiple intramural and subserosal fibroids.  The largest fibroid is along the posterior body of the uterus and measures 2.7 x 1.5 x 2.2 cm.  Along the anterior body of the uterus is a 1.9 x 2 x 2.6 cm fibroid.  Along the left body of the uterus is a 1.7 x 1.9 x 2 cm fibroid and along the anterior fundus of the uterus a 2 x 1.7 x 1.4 fibroid. The ovaries are normal in size and appearance. The right ovary measures 2.5 x 1.8 x 1.8 cm.   The left ovary measures 1.8 x 1.3 x 1.3cm.    There is appropriate flow in the ovaries.  Impression: No finding to correlate with the history of pain. Multiple uterine fibroids  3/2020 EMBx benign     Preoperative Diagnosis:  1. Urinary incontinence, mixed    2. Cystocele, midline    3. Rectocele, female    4. Uterovaginal prolapse    5. Abnormal uterine bleeding    6. Chronic constipation    7. Nocturia more than twice per night    8.    Urodynamic stress incontinence     Postoperative Diagnosis:  1. Urinary incontinence, mixed    2. Cystocele, midline    3. Rectocele, female    4. Uterovaginal prolapse    5. Abnormal uterine bleeding    6. Chronic constipation    7. Nocturia more than twice per night    8.    Urodynamic stress incontinence    Issues include:  Patient Active Problem List   Diagnosis    Myopia with astigmatism    Neck pain    Urinary incontinence, mixed    Cystocele, midline    Rectocele, female    Uterovaginal prolapse    Abnormal uterine bleeding    Chronic constipation    Nocturia more than twice per night    Menometrorrhagia     History since last visit: No VB, discharge, pain.    Bladder issues: No UI.  Still has some baseline frequency.  No dysuria.  +Complete bladder emptying.   Bowel issues: No straining.  Eating high fiber.  Uses stool softener PRN.     Medications:    Current Outpatient Medications:     ibuprofen (ADVIL,MOTRIN)  "600 MG tablet, Take 1 tablet (600 mg total) by mouth every 6 (six) hours., Disp: 30 tablet, Rfl: 2    loratadine (CLARITIN) 10 mg tablet, Take 10 mg by mouth once daily., Disp: , Rfl:     GREEN TEA EXTRACT ORAL, Take by mouth., Disp: , Rfl:     Well-woman:  Pap test: 1/2020 pap normal/HPV neg.  History of abnormal paps: No.  History of STIs:  No  Mammogram: Date of last: 1/2020.  Result: Normal  Colonoscopy: none  DEXA:  none    Exam  /85 (BP Location: Right arm, Patient Position: Sitting, BP Method: Medium (Automatic))   Ht 5' 2" (1.575 m)   Wt 59.7 kg (131 lb 9.8 oz)   LMP 06/09/2020   BMI 24.07 kg/m²   General: alert and oriented, no acute distress  Respiratory: normal respiratory effort  Abd: soft, non-tender, non-distended  LSC incisions well-healed  SP incisions well-healed    Pelvic  Ext. Genitalia: normal external genitalia. Normal bartholins and skenes glands  Vagina: neg atrophy. Normal vaginal mucosa without lesions. No discharge noted.   Non-tender bladder base without palpable mass. Sling path NT, no mesh, a few sutures still visible.   Cervix: absent  Uterus:  surgically absent, vaginal cuff well healed.   Urethra: no masses or tenderness  Urethral meatus: no lesions, caruncle or prolapse.    POP-Q: deferred.  No POP.     Impression  1. Nocturia more than twice per night     2. Chronic constipation       We reviewed the above issues and discussed options for short-term versus long-term management of her problems.   Plan:   1. Postop state: Always get help lifting 50 lbs or more. No >25 lbs at work.  Wait 1 more month for intercourse.  May resume all other activities.   2. Continue to avoid straining with bowel movements.   a. High fiber diet.   b. Take fiber supplement if needed.   c. Use stool softener as needed.   3. Urinary frequency:  a. Empty bladder every 2-3 hours.  Empty well: wait a minute, lean forward on toilet.    b. Avoid dietary irritants (see sheet).  Keep diary x 3-5 days to " determine your irritants.  c. KEGELS: do 10 in AM and 10 in PM, holding each x 10 seconds.  When you feel urge to go, STOP, KEGEL, and when urge has passed, then go to bathroom.  Consider PT in future.    d. URGE: consider medication in future.  Takes 2-4 weeks to see if will have effect.  For dry mouth: get sour, sugar free lozenge or gum.    4. She will follow up with us in 4 months (1/2021) for 6 month check.    30 minutes were spent in face to face time with this patient  90 % of this time was spent in counseling and/or coordination of care     Pieter Castillo MD  [facility]  Division of Female Pelvic Medicine and Reconstructive Surgery  Department of Obstetrics & Gynecology

## 2020-08-28 NOTE — PATIENT INSTRUCTIONS
1. Postop state: Always get help lifting 50 lbs or more. No >25 lbs at work.  Wait 1 more month for intercourse.  May resume all other activities.   2. Continue to avoid straining with bowel movements.   a. High fiber diet.   b. Take fiber supplement if needed.   c. Use stool softener as needed.   3. Urinary frequency:  a. Empty bladder every 2-3 hours.  Empty well: wait a minute, lean forward on toilet.    b. Avoid dietary irritants (see sheet).  Keep diary x 3-5 days to determine your irritants.  c. KEGELS: do 10 in AM and 10 in PM, holding each x 10 seconds.  When you feel urge to go, STOP, KEGEL, and when urge has passed, then go to bathroom.  Consider PT in future.    d. URGE: consider medication in future.  Takes 2-4 weeks to see if will have effect.  For dry mouth: get sour, sugar free lozenge or gum.    4. She will follow up with us in 4 months (1/2021) for 6 month check.

## 2020-11-29 ENCOUNTER — PATIENT MESSAGE (OUTPATIENT)
Dept: UROGYNECOLOGY | Facility: CLINIC | Age: 45
End: 2020-11-29

## 2020-11-29 DIAGNOSIS — R30.0 DYSURIA: Primary | ICD-10-CM

## 2020-11-30 ENCOUNTER — LAB VISIT (OUTPATIENT)
Dept: LAB | Facility: HOSPITAL | Age: 45
End: 2020-11-30
Attending: NURSE PRACTITIONER
Payer: COMMERCIAL

## 2020-11-30 DIAGNOSIS — R30.0 DYSURIA: Primary | ICD-10-CM

## 2020-11-30 DIAGNOSIS — R30.0 DYSURIA: ICD-10-CM

## 2020-11-30 LAB
BACTERIA #/AREA URNS AUTO: ABNORMAL /HPF
BILIRUB UR QL STRIP: NEGATIVE
CLARITY UR REFRACT.AUTO: ABNORMAL
COLOR UR AUTO: ABNORMAL
GLUCOSE UR QL STRIP: NEGATIVE
HGB UR QL STRIP: ABNORMAL
HYALINE CASTS UR QL AUTO: 0 /LPF
KETONES UR QL STRIP: NEGATIVE
LEUKOCYTE ESTERASE UR QL STRIP: ABNORMAL
MICROSCOPIC COMMENT: ABNORMAL
NITRITE UR QL STRIP: NEGATIVE
PH UR STRIP: 5 [PH] (ref 5–8)
PROT UR QL STRIP: ABNORMAL
RBC #/AREA URNS AUTO: 39 /HPF (ref 0–4)
SP GR UR STRIP: 1.01 (ref 1–1.03)
SQUAMOUS #/AREA URNS AUTO: 1 /HPF
URN SPEC COLLECT METH UR: ABNORMAL
WBC #/AREA URNS AUTO: >100 /HPF (ref 0–5)

## 2020-11-30 PROCEDURE — 87186 SC STD MICRODIL/AGAR DIL: CPT

## 2020-11-30 PROCEDURE — 87088 URINE BACTERIA CULTURE: CPT

## 2020-11-30 PROCEDURE — 81001 URINALYSIS AUTO W/SCOPE: CPT

## 2020-11-30 PROCEDURE — 87077 CULTURE AEROBIC IDENTIFY: CPT

## 2020-11-30 PROCEDURE — 87086 URINE CULTURE/COLONY COUNT: CPT

## 2020-11-30 RX ORDER — NITROFURANTOIN (MACROCRYSTALS) 100 MG/1
100 CAPSULE ORAL 2 TIMES DAILY
Qty: 14 CAPSULE | Refills: 0 | Status: SHIPPED | OUTPATIENT
Start: 2020-11-30 | End: 2020-12-07

## 2020-11-30 NOTE — TELEPHONE ENCOUNTER
Spoke to pt, she states she thinks she may have a bladder infection and reports symptoms started Sunday morning 11/29/2020. She reports burning on urination and urinary frequency and is requesting an antibiotic.     Pt was informed she will need to drop off a urine specimen at her nearest [facility] facility for culture. Pt verbalized understanding.    Orders placed.

## 2020-12-01 ENCOUNTER — PATIENT MESSAGE (OUTPATIENT)
Dept: UROGYNECOLOGY | Facility: CLINIC | Age: 45
End: 2020-12-01

## 2020-12-01 NOTE — TELEPHONE ENCOUNTER
nitrofurantoin (MACRODANTIN) 100 MG capsule 14 capsule 0 11/30/2020 12/7/2020 --   Sig - Route: Take 1 capsule (100 mg total) by mouth 2 (two) times daily. for 7 days - Oral     Called in to [facility] at [address] [employer] phone [phone], per pt's request. Pt was informed.

## 2020-12-03 ENCOUNTER — PATIENT MESSAGE (OUTPATIENT)
Dept: UROGYNECOLOGY | Facility: CLINIC | Age: 45
End: 2020-12-03

## 2020-12-03 LAB — BACTERIA UR CULT: ABNORMAL

## 2020-12-08 ENCOUNTER — PATIENT MESSAGE (OUTPATIENT)
Dept: UROGYNECOLOGY | Facility: CLINIC | Age: 45
End: 2020-12-08

## 2020-12-08 DIAGNOSIS — N30.00 ACUTE CYSTITIS WITHOUT HEMATURIA: Primary | ICD-10-CM

## 2020-12-08 RX ORDER — CEPHALEXIN 500 MG/1
500 CAPSULE ORAL 2 TIMES DAILY
Qty: 14 CAPSULE | Refills: 0 | Status: SHIPPED | OUTPATIENT
Start: 2020-12-08 | End: 2020-12-15

## 2020-12-08 NOTE — TELEPHONE ENCOUNTER
Patient still has dysuria. Will treat with keflex. If symptoms do not improve, she will need to come into clinic for evaluation. Verbalized understanding. Jenny Rush, JENNI-BC

## 2021-01-18 ENCOUNTER — OFFICE VISIT (OUTPATIENT)
Dept: URGENT CARE | Facility: CLINIC | Age: 46
End: 2021-01-18
Payer: COMMERCIAL

## 2021-01-18 VITALS
DIASTOLIC BLOOD PRESSURE: 95 MMHG | HEART RATE: 98 BPM | OXYGEN SATURATION: 99 % | HEIGHT: 62 IN | TEMPERATURE: 98 F | RESPIRATION RATE: 16 BRPM | WEIGHT: 131 LBS | BODY MASS INDEX: 24.11 KG/M2 | SYSTOLIC BLOOD PRESSURE: 132 MMHG

## 2021-01-18 DIAGNOSIS — U07.1 COVID-19 VIRUS DETECTED: ICD-10-CM

## 2021-01-18 DIAGNOSIS — H10.32 ACUTE BACTERIAL CONJUNCTIVITIS OF LEFT EYE: ICD-10-CM

## 2021-01-18 DIAGNOSIS — Z11.59 ENCOUNTER FOR SCREENING FOR OTHER VIRAL DISEASES: Primary | ICD-10-CM

## 2021-01-18 DIAGNOSIS — U07.1 COVID-19: ICD-10-CM

## 2021-01-18 LAB
CTP QC/QA: YES
SARS-COV-2 RDRP RESP QL NAA+PROBE: POSITIVE

## 2021-01-18 PROCEDURE — 99203 OFFICE O/P NEW LOW 30 MIN: CPT | Mod: S$GLB,,, | Performed by: NURSE PRACTITIONER

## 2021-01-18 PROCEDURE — U0002: ICD-10-PCS | Mod: QW,S$GLB,, | Performed by: NURSE PRACTITIONER

## 2021-01-18 PROCEDURE — 99203 PR OFFICE/OUTPT VISIT, NEW, LEVL III, 30-44 MIN: ICD-10-PCS | Mod: S$GLB,,, | Performed by: NURSE PRACTITIONER

## 2021-01-18 PROCEDURE — U0002 COVID-19 LAB TEST NON-CDC: HCPCS | Mod: QW,S$GLB,, | Performed by: NURSE PRACTITIONER

## 2021-01-18 RX ORDER — GENTAMICIN SULFATE 3 MG/ML
SOLUTION/ DROPS OPHTHALMIC
Qty: 15 ML | Refills: 0 | Status: SHIPPED | OUTPATIENT
Start: 2021-01-18 | End: 2021-03-08

## 2021-02-03 ENCOUNTER — PATIENT MESSAGE (OUTPATIENT)
Dept: OBSTETRICS AND GYNECOLOGY | Facility: CLINIC | Age: 46
End: 2021-02-03

## 2021-02-03 DIAGNOSIS — Z12.31 ENCOUNTER FOR SCREENING MAMMOGRAM FOR MALIGNANT NEOPLASM OF BREAST: Primary | ICD-10-CM

## 2021-02-09 ENCOUNTER — HOSPITAL ENCOUNTER (OUTPATIENT)
Dept: RADIOLOGY | Facility: HOSPITAL | Age: 46
Discharge: HOME | End: 2021-02-09
Attending: OBSTETRICS & GYNECOLOGY
Payer: COMMERCIAL

## 2021-02-09 VITALS — BODY MASS INDEX: 23.37 KG/M2 | HEIGHT: 62 IN | WEIGHT: 127 LBS

## 2021-02-09 DIAGNOSIS — Z12.31 ENCOUNTER FOR SCREENING MAMMOGRAM FOR MALIGNANT NEOPLASM OF BREAST: ICD-10-CM

## 2021-02-09 PROCEDURE — 77063 BREAST TOMOSYNTHESIS BI: CPT | Mod: 26,,, | Performed by: RADIOLOGY

## 2021-02-09 PROCEDURE — 77063 MAMMO DIGITAL SCREENING BILAT WITH TOMO: ICD-10-PCS | Mod: 26,,, | Performed by: RADIOLOGY

## 2021-02-09 PROCEDURE — 77067 SCR MAMMO BI INCL CAD: CPT | Mod: 26,,, | Performed by: RADIOLOGY

## 2021-02-09 PROCEDURE — 77067 MAMMO DIGITAL SCREENING BILAT WITH TOMO: ICD-10-PCS | Mod: 26,,, | Performed by: RADIOLOGY

## 2021-02-09 PROCEDURE — 77067 SCR MAMMO BI INCL CAD: CPT | Mod: TC

## 2021-02-10 ENCOUNTER — PATIENT MESSAGE (OUTPATIENT)
Dept: INTERNAL MEDICINE | Facility: CLINIC | Age: 46
End: 2021-02-10

## 2021-02-10 DIAGNOSIS — U07.1 COVID-19: Primary | ICD-10-CM

## 2021-02-11 ENCOUNTER — LAB VISIT (OUTPATIENT)
Dept: LAB | Facility: HOSPITAL | Age: 46
End: 2021-02-11
Attending: INTERNAL MEDICINE
Payer: COMMERCIAL

## 2021-02-11 DIAGNOSIS — U07.1 COVID-19: ICD-10-CM

## 2021-02-11 LAB — SARS-COV-2 IGG SERPLBLD QL IA.RAPID: POSITIVE

## 2021-02-11 PROCEDURE — 36415 COLL VENOUS BLD VENIPUNCTURE: CPT | Mod: PO

## 2021-02-11 PROCEDURE — 86769 SARS-COV-2 COVID-19 ANTIBODY: CPT

## 2021-02-18 ENCOUNTER — OFFICE VISIT (OUTPATIENT)
Dept: UROGYNECOLOGY | Facility: CLINIC | Age: 46
End: 2021-02-18
Payer: COMMERCIAL

## 2021-02-18 VITALS
DIASTOLIC BLOOD PRESSURE: 97 MMHG | HEIGHT: 62 IN | SYSTOLIC BLOOD PRESSURE: 128 MMHG | WEIGHT: 127.69 LBS | BODY MASS INDEX: 23.5 KG/M2 | HEART RATE: 82 BPM

## 2021-02-18 DIAGNOSIS — Z09 POSTOP CHECK: ICD-10-CM

## 2021-02-18 DIAGNOSIS — K59.09 CHRONIC CONSTIPATION: Primary | ICD-10-CM

## 2021-02-18 DIAGNOSIS — N94.10 DYSPAREUNIA, FEMALE: ICD-10-CM

## 2021-02-18 PROCEDURE — 99999 PR PBB SHADOW E&M-EST. PATIENT-LVL III: ICD-10-PCS | Mod: PBBFAC,,, | Performed by: OBSTETRICS & GYNECOLOGY

## 2021-02-18 PROCEDURE — 99213 PR OFFICE/OUTPT VISIT, EST, LEVL III, 20-29 MIN: ICD-10-PCS | Mod: S$GLB,,, | Performed by: OBSTETRICS & GYNECOLOGY

## 2021-02-18 PROCEDURE — 99999 PR PBB SHADOW E&M-EST. PATIENT-LVL III: CPT | Mod: PBBFAC,,, | Performed by: OBSTETRICS & GYNECOLOGY

## 2021-02-18 PROCEDURE — 99213 OFFICE O/P EST LOW 20 MIN: CPT | Mod: S$GLB,,, | Performed by: OBSTETRICS & GYNECOLOGY

## 2021-02-26 ENCOUNTER — TELEPHONE (OUTPATIENT)
Dept: INTERNAL MEDICINE | Facility: CLINIC | Age: 46
End: 2021-02-26

## 2021-02-26 DIAGNOSIS — Z00.00 ANNUAL PHYSICAL EXAM: Primary | ICD-10-CM

## 2021-03-08 ENCOUNTER — OFFICE VISIT (OUTPATIENT)
Dept: OBSTETRICS AND GYNECOLOGY | Facility: CLINIC | Age: 46
End: 2021-03-08
Attending: OBSTETRICS & GYNECOLOGY
Payer: COMMERCIAL

## 2021-03-08 VITALS — DIASTOLIC BLOOD PRESSURE: 80 MMHG | SYSTOLIC BLOOD PRESSURE: 120 MMHG

## 2021-03-08 DIAGNOSIS — Z12.31 ENCOUNTER FOR SCREENING MAMMOGRAM FOR MALIGNANT NEOPLASM OF BREAST: ICD-10-CM

## 2021-03-08 DIAGNOSIS — Z01.419 ENCOUNTER FOR GYNECOLOGICAL EXAMINATION WITHOUT ABNORMAL FINDING: Primary | ICD-10-CM

## 2021-03-08 PROCEDURE — 99396 PREV VISIT EST AGE 40-64: CPT | Mod: S$GLB,,, | Performed by: OBSTETRICS & GYNECOLOGY

## 2021-03-08 PROCEDURE — 99999 PR PBB SHADOW E&M-EST. PATIENT-LVL II: CPT | Mod: PBBFAC,,, | Performed by: OBSTETRICS & GYNECOLOGY

## 2021-03-08 PROCEDURE — 99396 PR PREVENTIVE VISIT,EST,40-64: ICD-10-PCS | Mod: S$GLB,,, | Performed by: OBSTETRICS & GYNECOLOGY

## 2021-03-08 PROCEDURE — 99999 PR PBB SHADOW E&M-EST. PATIENT-LVL II: ICD-10-PCS | Mod: PBBFAC,,, | Performed by: OBSTETRICS & GYNECOLOGY

## 2021-05-06 ENCOUNTER — PATIENT MESSAGE (OUTPATIENT)
Dept: UROGYNECOLOGY | Facility: CLINIC | Age: 46
End: 2021-05-06

## 2021-05-06 ENCOUNTER — LAB VISIT (OUTPATIENT)
Dept: LAB | Facility: HOSPITAL | Age: 46
End: 2021-05-06
Attending: OBSTETRICS & GYNECOLOGY
Payer: COMMERCIAL

## 2021-05-06 DIAGNOSIS — R30.0 DYSURIA: Primary | ICD-10-CM

## 2021-05-06 DIAGNOSIS — R30.0 DYSURIA: ICD-10-CM

## 2021-05-06 LAB
AMORPH CRY UR QL COMP ASSIST: ABNORMAL
BACTERIA #/AREA URNS AUTO: ABNORMAL /HPF
BILIRUB UR QL STRIP: NEGATIVE
CLARITY UR REFRACT.AUTO: ABNORMAL
COLOR UR AUTO: ABNORMAL
GLUCOSE UR QL STRIP: NEGATIVE
HGB UR QL STRIP: NEGATIVE
KETONES UR QL STRIP: ABNORMAL
LEUKOCYTE ESTERASE UR QL STRIP: ABNORMAL
MICROSCOPIC COMMENT: ABNORMAL
NITRITE UR QL STRIP: POSITIVE
PH UR STRIP: 8 [PH] (ref 5–8)
PROT UR QL STRIP: NEGATIVE
SP GR UR STRIP: 1.01 (ref 1–1.03)
SQUAMOUS #/AREA URNS AUTO: 2 /HPF
URN SPEC COLLECT METH UR: ABNORMAL
WBC #/AREA URNS AUTO: 0 /HPF (ref 0–5)

## 2021-05-06 PROCEDURE — 87088 URINE BACTERIA CULTURE: CPT | Performed by: OBSTETRICS & GYNECOLOGY

## 2021-05-06 PROCEDURE — 87086 URINE CULTURE/COLONY COUNT: CPT | Performed by: OBSTETRICS & GYNECOLOGY

## 2021-05-06 PROCEDURE — 87186 SC STD MICRODIL/AGAR DIL: CPT | Performed by: OBSTETRICS & GYNECOLOGY

## 2021-05-06 PROCEDURE — 87077 CULTURE AEROBIC IDENTIFY: CPT | Performed by: OBSTETRICS & GYNECOLOGY

## 2021-05-06 PROCEDURE — 81001 URINALYSIS AUTO W/SCOPE: CPT | Performed by: OBSTETRICS & GYNECOLOGY

## 2021-05-06 RX ORDER — CEPHALEXIN 500 MG/1
500 CAPSULE ORAL 2 TIMES DAILY
Qty: 14 CAPSULE | Refills: 0 | Status: SHIPPED | OUTPATIENT
Start: 2021-05-06 | End: 2021-05-13

## 2021-05-09 LAB — BACTERIA UR CULT: ABNORMAL

## 2021-05-13 ENCOUNTER — PATIENT MESSAGE (OUTPATIENT)
Dept: UROGYNECOLOGY | Facility: CLINIC | Age: 46
End: 2021-05-13

## 2021-05-13 DIAGNOSIS — R30.0 DYSURIA: Primary | ICD-10-CM

## 2021-05-13 RX ORDER — CEPHALEXIN 500 MG/1
500 CAPSULE ORAL 2 TIMES DAILY
Qty: 6 CAPSULE | Refills: 0 | Status: SHIPPED | OUTPATIENT
Start: 2021-05-13 | End: 2021-05-16

## 2021-05-24 PROBLEM — Z09 POSTOP CHECK: Status: RESOLVED | Noted: 2021-02-18 | Resolved: 2021-05-24

## 2021-06-29 ENCOUNTER — PATIENT MESSAGE (OUTPATIENT)
Dept: INTERNAL MEDICINE | Facility: CLINIC | Age: 46
End: 2021-06-29

## 2021-06-29 DIAGNOSIS — U07.1 COVID-19: Primary | ICD-10-CM

## 2021-06-30 ENCOUNTER — PATIENT MESSAGE (OUTPATIENT)
Dept: INTERNAL MEDICINE | Facility: CLINIC | Age: 46
End: 2021-06-30

## 2021-07-21 ENCOUNTER — LAB VISIT (OUTPATIENT)
Dept: LAB | Facility: HOSPITAL | Age: 46
End: 2021-07-21
Attending: INTERNAL MEDICINE
Payer: COMMERCIAL

## 2021-07-21 DIAGNOSIS — U07.1 COVID-19: ICD-10-CM

## 2021-07-21 DIAGNOSIS — Z00.00 ANNUAL PHYSICAL EXAM: ICD-10-CM

## 2021-07-21 LAB
25(OH)D3+25(OH)D2 SERPL-MCNC: 19 NG/ML (ref 30–96)
ALBUMIN SERPL BCP-MCNC: 3.8 G/DL (ref 3.5–5.2)
ALP SERPL-CCNC: 47 U/L (ref 55–135)
ALT SERPL W/O P-5'-P-CCNC: 15 U/L (ref 10–44)
ANION GAP SERPL CALC-SCNC: 10 MMOL/L (ref 8–16)
AST SERPL-CCNC: 18 U/L (ref 10–40)
BASOPHILS # BLD AUTO: 0.07 K/UL (ref 0–0.2)
BASOPHILS NFR BLD: 1.1 % (ref 0–1.9)
BILIRUB SERPL-MCNC: 0.5 MG/DL (ref 0.1–1)
BUN SERPL-MCNC: 12 MG/DL (ref 6–20)
CALCIUM SERPL-MCNC: 9.5 MG/DL (ref 8.7–10.5)
CHLORIDE SERPL-SCNC: 106 MMOL/L (ref 95–110)
CHOLEST SERPL-MCNC: 185 MG/DL (ref 120–199)
CHOLEST/HDLC SERPL: 2.9 {RATIO} (ref 2–5)
CO2 SERPL-SCNC: 24 MMOL/L (ref 23–29)
CREAT SERPL-MCNC: 0.7 MG/DL (ref 0.5–1.4)
DIFFERENTIAL METHOD: NORMAL
EOSINOPHIL # BLD AUTO: 0.2 K/UL (ref 0–0.5)
EOSINOPHIL NFR BLD: 3.1 % (ref 0–8)
ERYTHROCYTE [DISTWIDTH] IN BLOOD BY AUTOMATED COUNT: 13.4 % (ref 11.5–14.5)
EST. GFR  (AFRICAN AMERICAN): >60 ML/MIN/1.73 M^2
EST. GFR  (NON AFRICAN AMERICAN): >60 ML/MIN/1.73 M^2
GLUCOSE SERPL-MCNC: 85 MG/DL (ref 70–110)
HCT VFR BLD AUTO: 37.4 % (ref 37–48.5)
HDLC SERPL-MCNC: 63 MG/DL (ref 40–75)
HDLC SERPL: 34.1 % (ref 20–50)
HGB BLD-MCNC: 12.3 G/DL (ref 12–16)
IMM GRANULOCYTES # BLD AUTO: 0.02 K/UL (ref 0–0.04)
IMM GRANULOCYTES NFR BLD AUTO: 0.3 % (ref 0–0.5)
LDLC SERPL CALC-MCNC: 114.4 MG/DL (ref 63–159)
LYMPHOCYTES # BLD AUTO: 2.2 K/UL (ref 1–4.8)
LYMPHOCYTES NFR BLD: 33.7 % (ref 18–48)
MCH RBC QN AUTO: 29.5 PG (ref 27–31)
MCHC RBC AUTO-ENTMCNC: 32.9 G/DL (ref 32–36)
MCV RBC AUTO: 90 FL (ref 82–98)
MONOCYTES # BLD AUTO: 0.4 K/UL (ref 0.3–1)
MONOCYTES NFR BLD: 6.5 % (ref 4–15)
NEUTROPHILS # BLD AUTO: 3.6 K/UL (ref 1.8–7.7)
NEUTROPHILS NFR BLD: 55.3 % (ref 38–73)
NONHDLC SERPL-MCNC: 122 MG/DL
NRBC BLD-RTO: 0 /100 WBC
PLATELET # BLD AUTO: 231 K/UL (ref 150–450)
PMV BLD AUTO: 11.6 FL (ref 9.2–12.9)
POTASSIUM SERPL-SCNC: 4 MMOL/L (ref 3.5–5.1)
PROT SERPL-MCNC: 6.8 G/DL (ref 6–8.4)
RBC # BLD AUTO: 4.17 M/UL (ref 4–5.4)
SODIUM SERPL-SCNC: 140 MMOL/L (ref 136–145)
TRIGL SERPL-MCNC: 38 MG/DL (ref 30–150)
TSH SERPL DL<=0.005 MIU/L-ACNC: 2.52 UIU/ML (ref 0.4–4)
WBC # BLD AUTO: 6.43 K/UL (ref 3.9–12.7)

## 2021-07-21 PROCEDURE — 82306 VITAMIN D 25 HYDROXY: CPT | Performed by: INTERNAL MEDICINE

## 2021-07-21 PROCEDURE — 80061 LIPID PANEL: CPT | Performed by: INTERNAL MEDICINE

## 2021-07-21 PROCEDURE — 86769 SARS-COV-2 COVID-19 ANTIBODY: CPT | Performed by: INTERNAL MEDICINE

## 2021-07-21 PROCEDURE — 80053 COMPREHEN METABOLIC PANEL: CPT | Performed by: INTERNAL MEDICINE

## 2021-07-21 PROCEDURE — 85025 COMPLETE CBC W/AUTO DIFF WBC: CPT | Performed by: INTERNAL MEDICINE

## 2021-07-21 PROCEDURE — 36415 COLL VENOUS BLD VENIPUNCTURE: CPT | Mod: PO | Performed by: INTERNAL MEDICINE

## 2021-07-21 PROCEDURE — 84443 ASSAY THYROID STIM HORMONE: CPT | Performed by: INTERNAL MEDICINE

## 2021-07-22 ENCOUNTER — PATIENT MESSAGE (OUTPATIENT)
Dept: INTERNAL MEDICINE | Facility: CLINIC | Age: 46
End: 2021-07-22

## 2021-07-23 LAB
SARS-COV-2 IGG SERPL IA-ACNC: 266.2 AU/ML
SARS-COV-2 IGG SERPL QL IA: POSITIVE

## 2021-07-28 ENCOUNTER — PATIENT MESSAGE (OUTPATIENT)
Dept: INTERNAL MEDICINE | Facility: CLINIC | Age: 46
End: 2021-07-28

## 2021-07-28 ENCOUNTER — OFFICE VISIT (OUTPATIENT)
Dept: INTERNAL MEDICINE | Facility: CLINIC | Age: 46
End: 2021-07-28
Payer: COMMERCIAL

## 2021-07-28 ENCOUNTER — HOSPITAL ENCOUNTER (OUTPATIENT)
Dept: RADIOLOGY | Facility: HOSPITAL | Age: 46
Discharge: HOME | End: 2021-07-28
Attending: INTERNAL MEDICINE
Payer: COMMERCIAL

## 2021-07-28 VITALS
WEIGHT: 130.06 LBS | RESPIRATION RATE: 16 BRPM | TEMPERATURE: 98 F | HEART RATE: 76 BPM | BODY MASS INDEX: 23.93 KG/M2 | OXYGEN SATURATION: 98 % | SYSTOLIC BLOOD PRESSURE: 118 MMHG | HEIGHT: 62 IN | DIASTOLIC BLOOD PRESSURE: 62 MMHG

## 2021-07-28 DIAGNOSIS — R27.8 WORSENED HANDWRITING: ICD-10-CM

## 2021-07-28 DIAGNOSIS — R25.3 JERKING: ICD-10-CM

## 2021-07-28 DIAGNOSIS — M62.40 INVOLUNTARY MUSCLE CONTRACTIONS: ICD-10-CM

## 2021-07-28 DIAGNOSIS — E55.9 VITAMIN D DEFICIENCY: ICD-10-CM

## 2021-07-28 DIAGNOSIS — R76.8 COVID-19 VIRUS IGG ANTIBODY DETECTED: ICD-10-CM

## 2021-07-28 DIAGNOSIS — M54.12 RADICULOPATHY, CERVICAL REGION: ICD-10-CM

## 2021-07-28 DIAGNOSIS — M62.838 MUSCLE SPASM: ICD-10-CM

## 2021-07-28 DIAGNOSIS — Z00.00 ANNUAL PHYSICAL EXAM: Primary | ICD-10-CM

## 2021-07-28 PROCEDURE — 72050 XR CERVICAL SPINE COMPLETE 5 VIEW: ICD-10-PCS | Mod: 26,,, | Performed by: RADIOLOGY

## 2021-07-28 PROCEDURE — 72050 X-RAY EXAM NECK SPINE 4/5VWS: CPT | Mod: TC,PO

## 2021-07-28 PROCEDURE — 73130 X-RAY EXAM OF HAND: CPT | Mod: TC,PO,RT

## 2021-07-28 PROCEDURE — 99396 PR PREVENTIVE VISIT,EST,40-64: ICD-10-PCS | Mod: S$GLB,,, | Performed by: INTERNAL MEDICINE

## 2021-07-28 PROCEDURE — 99999 PR PBB SHADOW E&M-EST. PATIENT-LVL V: ICD-10-PCS | Mod: PBBFAC,,, | Performed by: INTERNAL MEDICINE

## 2021-07-28 PROCEDURE — 73130 XR HAND COMPLETE 3 VIEW RIGHT: ICD-10-PCS | Mod: 26,RT,, | Performed by: RADIOLOGY

## 2021-07-28 PROCEDURE — 99999 PR PBB SHADOW E&M-EST. PATIENT-LVL V: CPT | Mod: PBBFAC,,, | Performed by: INTERNAL MEDICINE

## 2021-07-28 PROCEDURE — 99396 PREV VISIT EST AGE 40-64: CPT | Mod: S$GLB,,, | Performed by: INTERNAL MEDICINE

## 2021-07-28 PROCEDURE — 72050 X-RAY EXAM NECK SPINE 4/5VWS: CPT | Mod: 26,,, | Performed by: RADIOLOGY

## 2021-07-28 PROCEDURE — 73130 X-RAY EXAM OF HAND: CPT | Mod: 26,RT,, | Performed by: RADIOLOGY

## 2021-08-16 ENCOUNTER — HOSPITAL ENCOUNTER (OUTPATIENT)
Dept: RADIOLOGY | Facility: HOSPITAL | Age: 46
Discharge: HOME | End: 2021-08-16
Attending: INTERNAL MEDICINE
Payer: COMMERCIAL

## 2021-08-16 DIAGNOSIS — M54.12 RADICULOPATHY, CERVICAL REGION: ICD-10-CM

## 2021-08-16 PROCEDURE — 72141 MRI NECK SPINE W/O DYE: CPT | Mod: TC

## 2021-08-16 PROCEDURE — 72141 MRI CERVICAL SPINE WITHOUT CONTRAST: ICD-10-PCS | Mod: 26,GC,, | Performed by: INTERNAL MEDICINE

## 2021-08-16 PROCEDURE — 72141 MRI NECK SPINE W/O DYE: CPT | Mod: 26,GC,, | Performed by: INTERNAL MEDICINE

## 2021-09-11 ENCOUNTER — PATIENT MESSAGE (OUTPATIENT)
Dept: INTERNAL MEDICINE | Facility: CLINIC | Age: 46
End: 2021-09-11

## 2021-09-13 ENCOUNTER — PATIENT MESSAGE (OUTPATIENT)
Dept: INTERNAL MEDICINE | Facility: CLINIC | Age: 46
End: 2021-09-13

## 2021-09-13 RX ORDER — ORPHENADRINE CITRATE 100 MG/1
100 TABLET, EXTENDED RELEASE ORAL 2 TIMES DAILY
Qty: 60 TABLET | Refills: 0 | Status: SHIPPED | OUTPATIENT
Start: 2021-09-13 | End: 2021-10-13

## 2021-10-04 ENCOUNTER — PATIENT MESSAGE (OUTPATIENT)
Dept: NEUROLOGY | Facility: CLINIC | Age: 46
End: 2021-10-04

## 2021-10-04 ENCOUNTER — OFFICE VISIT (OUTPATIENT)
Dept: NEUROLOGY | Facility: CLINIC | Age: 46
End: 2021-10-04
Payer: COMMERCIAL

## 2021-10-04 DIAGNOSIS — M54.10 RADICULOPATHY, UNSPECIFIED SPINAL REGION: ICD-10-CM

## 2021-10-04 DIAGNOSIS — G24.3 CERVICAL DYSTONIA: Primary | ICD-10-CM

## 2021-10-04 DIAGNOSIS — M62.40 INVOLUNTARY MUSCLE CONTRACTIONS: ICD-10-CM

## 2021-10-04 DIAGNOSIS — R27.8 WORSENED HANDWRITING: ICD-10-CM

## 2021-10-04 DIAGNOSIS — F48.8 WRITER'S CRAMP: ICD-10-CM

## 2021-10-04 DIAGNOSIS — R25.3 JERKING: ICD-10-CM

## 2021-10-04 PROCEDURE — 99204 PR OFFICE/OUTPT VISIT, NEW, LEVL IV, 45-59 MIN: ICD-10-PCS | Mod: 95,,, | Performed by: PSYCHIATRY & NEUROLOGY

## 2021-10-04 PROCEDURE — 99204 OFFICE O/P NEW MOD 45 MIN: CPT | Mod: 95,,, | Performed by: PSYCHIATRY & NEUROLOGY

## 2021-10-04 RX ORDER — AMITRIPTYLINE HYDROCHLORIDE 10 MG/1
10 TABLET, FILM COATED ORAL NIGHTLY PRN
Qty: 30 TABLET | Refills: 12 | Status: SHIPPED | OUTPATIENT
Start: 2021-10-04 | End: 2022-10-04

## 2021-10-18 ENCOUNTER — HOSPITAL ENCOUNTER (OUTPATIENT)
Dept: RADIOLOGY | Facility: HOSPITAL | Age: 46
Discharge: HOME | End: 2021-10-18
Attending: PSYCHIATRY & NEUROLOGY
Payer: COMMERCIAL

## 2021-10-18 DIAGNOSIS — G24.3 CERVICAL DYSTONIA: ICD-10-CM

## 2021-10-18 PROCEDURE — 70553 MRI BRAIN STEM W/O & W/DYE: CPT | Mod: 26,,, | Performed by: RADIOLOGY

## 2021-10-18 PROCEDURE — 25500020 PHARM REV CODE 255: Performed by: PSYCHIATRY & NEUROLOGY

## 2021-10-18 PROCEDURE — 70553 MRI BRAIN STEM W/O & W/DYE: CPT | Mod: TC

## 2021-10-18 PROCEDURE — 70553 MRI BRAIN W WO CONTRAST: ICD-10-PCS | Mod: 26,,, | Performed by: RADIOLOGY

## 2021-10-18 PROCEDURE — A9585 GADOBUTROL INJECTION: HCPCS | Performed by: PSYCHIATRY & NEUROLOGY

## 2021-10-18 RX ORDER — GADOBUTROL 604.72 MG/ML
7 INJECTION INTRAVENOUS
Status: COMPLETED | OUTPATIENT
Start: 2021-10-18 | End: 2021-10-18

## 2021-10-18 RX ADMIN — GADOBUTROL 7 ML: 604.72 INJECTION INTRAVENOUS at 10:10

## 2021-10-20 ENCOUNTER — LAB VISIT (OUTPATIENT)
Dept: LAB | Facility: HOSPITAL | Age: 46
End: 2021-10-20
Attending: INTERNAL MEDICINE
Payer: COMMERCIAL

## 2021-10-20 DIAGNOSIS — R76.8 COVID-19 VIRUS IGG ANTIBODY DETECTED: ICD-10-CM

## 2021-10-20 LAB
SARS-COV-2 IGG SERPL IA-ACNC: 207.6 AU/ML
SARS-COV-2 IGG SERPL QL IA: POSITIVE

## 2021-10-20 PROCEDURE — 86769 SARS-COV-2 COVID-19 ANTIBODY: CPT | Performed by: INTERNAL MEDICINE

## 2021-10-20 PROCEDURE — 36415 COLL VENOUS BLD VENIPUNCTURE: CPT | Mod: PO | Performed by: INTERNAL MEDICINE

## 2021-11-09 ENCOUNTER — DOCUMENTATION ONLY (OUTPATIENT)
Dept: NEUROLOGY | Facility: CLINIC | Age: 46
End: 2021-11-09
Payer: COMMERCIAL

## 2021-11-19 DIAGNOSIS — G25.89 ORGANIC WRITER'S CRAMP: Primary | ICD-10-CM

## 2021-12-07 ENCOUNTER — PATIENT MESSAGE (OUTPATIENT)
Dept: INTERNAL MEDICINE | Facility: CLINIC | Age: 46
End: 2021-12-07
Payer: COMMERCIAL

## 2021-12-07 RX ORDER — OSELTAMIVIR PHOSPHATE 75 MG/1
75 CAPSULE ORAL 2 TIMES DAILY
Qty: 10 CAPSULE | Refills: 0 | Status: SHIPPED | OUTPATIENT
Start: 2021-12-07 | End: 2021-12-12

## 2022-02-21 ENCOUNTER — PATIENT MESSAGE (OUTPATIENT)
Dept: NEUROLOGY | Facility: CLINIC | Age: 47
End: 2022-02-21
Payer: COMMERCIAL

## 2022-09-14 DIAGNOSIS — Z12.31 OTHER SCREENING MAMMOGRAM: ICD-10-CM

## 2022-09-20 ENCOUNTER — PATIENT MESSAGE (OUTPATIENT)
Dept: ADMINISTRATIVE | Facility: HOSPITAL | Age: 47
End: 2022-09-20
Payer: COMMERCIAL

## 2022-10-26 NOTE — PROGRESS NOTES
Progress Note  Gynecology    Admit Date: 2020  LOS: 0    Reason for Admission:  Menometrorrhagia    SUBJECTIVE:     Christelle Reyes is a 44 y.o.  who is POD#1 s/p RA-TLH/BS/uterosacral ligament suspension/MUS/posterior colporraphy/cystoscopy for the treatment of menometrorrhagia and uterovaginal prolapse.  Pt doing well this morning. Pain is well controlled. She is tolerating a regular diet without N/V. She has not yet attempted to ambulate.Voiding without difficulty via iniguez. She is not yet passing flatus. She is not yet having bowel movements.    OBJECTIVE:     Vital Signs   Temp:  [97.5 °F (36.4 °C)-98.8 °F (37.1 °C)] 98.5 °F (36.9 °C)  Pulse:  [81-96] 88  Resp:  [16-20] 18  SpO2:  [95 %-100 %] 97 %  BP: (120-145)/(71-86) 128/75      Intake/Output Summary (Last 24 hours) at 7/15/2020 0635  Last data filed at 7/15/2020 0512  Gross per 24 hour   Intake 3331.25 ml   Output 3155 ml   Net 176.25 ml       Physical Exam:  Gen: A&Ox3, NAD  CV: RRR  Pulm: LCTAB, normal respiratory effort  Abd: active bowel sounds, soft, non-distended, non-tender to palpation without rebound or guarding  Inc: robotic port sites clean dry and intact with bandages overlying   Ext: PPP, no peripheral edema, TEDs/SCDs in place  : Iniguez in place draining clear yellow urine; packing removed     Laboratory:  Recent Results (from the past 24 hour(s))   CBC auto differential    Collection Time: 07/15/20  3:54 AM   Result Value Ref Range    WBC 13.78 (H) 3.90 - 12.70 K/uL    RBC 4.34 4.00 - 5.40 M/uL    Hemoglobin 12.5 12.0 - 16.0 g/dL    Hematocrit 39.0 37.0 - 48.5 %    Mean Corpuscular Volume 90 82 - 98 fL    Mean Corpuscular Hemoglobin 28.8 27.0 - 31.0 pg    Mean Corpuscular Hemoglobin Conc 32.1 32.0 - 36.0 g/dL    RDW 13.1 11.5 - 14.5 %    Platelets 217 150 - 350 K/uL    MPV 10.4 9.2 - 12.9 fL    Immature Granulocytes 0.4 0.0 - 0.5 %    Gran # (ANC) 9.7 (H) 1.8 - 7.7 K/uL    Immature Grans (Abs) 0.05 (H) 0.00 - 0.04 K/uL     Lymph # 2.8 1.0 - 4.8 K/uL    Mono # 1.1 (H) 0.3 - 1.0 K/uL    Eos # 0.0 0.0 - 0.5 K/uL    Baso # 0.04 0.00 - 0.20 K/uL    nRBC 0 0 /100 WBC    Gran% 70.6 38.0 - 73.0 %    Lymph% 20.2 18.0 - 48.0 %    Mono% 8.3 4.0 - 15.0 %    Eosinophil% 0.2 0.0 - 8.0 %    Basophil% 0.3 0.0 - 1.9 %    Differential Method Automated        Diagnostic Results:  N/a     ASSESSMENT/PLAN:     Active Hospital Problems    Diagnosis  POA    Menometrorrhagia [N92.1]  Yes      Resolved Hospital Problems   No resolved problems to display.       Assessment: 44 y.o.  POD#1 s/p RA-TLH/BS/USLS/MUS/Posterior colporraphy/cysto    Plan:   1. Post-op   - Routine post-op advances  - Continue PRN pain medications  - D/C iniguez and perform active voiding trial   - Encourage ambulation   - Encourage IS  - CBC stable @  from   - UOP adequate   - Continue IVF until tolerating regular diet.  - Antiemetics prn nausea/vomiting.      Dispo: As patient meets appropriate post-op milestones, plan for discharge to home later today.    Ashlie Ortiz MD  OBGYN, PGY-3     0

## (undated) DEVICE — BANDAGE GAUZE 6PLY FLUFF 2X3

## (undated) DEVICE — OBTURATOR BLADELESS 8MM XI CLR

## (undated) DEVICE — ELECTRODE REM PLYHSV RETURN 9

## (undated) DEVICE — SEE MEDLINE ITEM 156923

## (undated) DEVICE — KIT WING PAD POSITIONING

## (undated) DEVICE — SEE MEDLINE ITEM 156930

## (undated) DEVICE — SUT VICRYL 0 27 CT-2

## (undated) DEVICE — KIT URINE METER 350ML STAT LOC

## (undated) DEVICE — APPLICATOR HEMOBLAST LAPSCP

## (undated) DEVICE — SOL WATER STRL IRR 1000ML

## (undated) DEVICE — SUT MCRYL PLUS 4-0 PS2 27IN

## (undated) DEVICE — DRAPE COLUMN DAVINCI XI

## (undated) DEVICE — GLOVE BIOGEL SKINSENSE PI 7.0

## (undated) DEVICE — IRRIGATOR ENDOSCOPY DISP.

## (undated) DEVICE — SEE MEDLINE ITEM 146347

## (undated) DEVICE — JELLY SURGILUBE 5GR

## (undated) DEVICE — SUT VICRYL CTD 2-0 GI 27 SH

## (undated) DEVICE — BELLOW CANN HEMOBLAST 1.65GR

## (undated) DEVICE — DRAPE STERI INSTRUMENT 1018

## (undated) DEVICE — SOL STRL WATER INJ 1000ML BG

## (undated) DEVICE — SOL NS 1000CC

## (undated) DEVICE — BLADE SURG STAINLESS STEEL #15

## (undated) DEVICE — SET CYSTO IRRIGATION UNIV SPIK

## (undated) DEVICE — ADHESIVE DERMABOND ADVANCED

## (undated) DEVICE — DRESSING LEUKOPLAST FLEX 1X3IN

## (undated) DEVICE — GLOVE BIOGEL SKINSENSE PI 6.5

## (undated) DEVICE — PAD PREP 50/CA

## (undated) DEVICE — MANIPULATOR VCARE PLUS 37MM LG

## (undated) DEVICE — SEAL UNIVERSAL 5MM-8MM XI

## (undated) DEVICE — CLOSURE SKIN STERI STRIP 1/2X4

## (undated) DEVICE — SOL 9P NACL IRR PIC IL

## (undated) DEVICE — DRAPE ARM DAVINCI XI

## (undated) DEVICE — CLIPPER BLADE MOD 4406 (CAREF)

## (undated) DEVICE — NDL INSUF ULTRA VERESS 120MM

## (undated) DEVICE — SUT PDS II O CT-2 VIL MONO

## (undated) DEVICE — DRAPE UNDER BUTTOCKS WITH POUCH

## (undated) DEVICE — SET TRI-LUMEN FILTERED TUBE

## (undated) DEVICE — COVER TIP CURVED SCISSORS XI

## (undated) DEVICE — SOL CLEARIFY VISUALIZATION LAP

## (undated) DEVICE — INSERT CUSHIONPRONE VIEW LARGE

## (undated) DEVICE — SOL ELECTROLUBE ANTI-STIC

## (undated) DEVICE — PORT ACCESS 8MM W/100MM LOW

## (undated) DEVICE — UNDERGLOVES BIOGEL PI SZ 7 LF

## (undated) DEVICE — SEE MEDLINE ITEM 157110